# Patient Record
Sex: FEMALE | Race: WHITE | Employment: OTHER | URBAN - METROPOLITAN AREA
[De-identification: names, ages, dates, MRNs, and addresses within clinical notes are randomized per-mention and may not be internally consistent; named-entity substitution may affect disease eponyms.]

---

## 2017-01-06 ENCOUNTER — OFFICE VISIT (OUTPATIENT)
Dept: OBGYN CLINIC | Facility: CLINIC | Age: 72
End: 2017-01-06

## 2017-01-06 VITALS
WEIGHT: 168.19 LBS | DIASTOLIC BLOOD PRESSURE: 61 MMHG | HEART RATE: 82 BPM | SYSTOLIC BLOOD PRESSURE: 120 MMHG | BODY MASS INDEX: 34 KG/M2

## 2017-01-06 DIAGNOSIS — L90.0 LICHEN SCLEROSUS: Primary | ICD-10-CM

## 2017-01-06 PROCEDURE — 99213 OFFICE O/P EST LOW 20 MIN: CPT | Performed by: OBSTETRICS & GYNECOLOGY

## 2017-01-06 NOTE — PROGRESS NOTES
Jose Ramon King is a 70year old female  No LMP recorded. Patient is postmenopausal. Patient presents with: Follow - Up  Patient is here for follow up for lichen sclerosus. She is doing well. She uses the Triamcinolone ointment rarely.  She has not h mg total) by mouth daily. , Disp: 90 tablet, Rfl: 4  •  Estradiol (ESTRACE) 0.1 MG/GM Vaginal Cream, Place 1 g vaginally every other day. Place  vaginally. , Disp: 3 Tube, Rfl: 11  •  Citalopram Hydrobromide 20 MG Oral Tab, Take 1 tablet (20 mg total) by yeimi Lichen lesion much improved. No longer with white parchment paper skin. Area looks atrophic but no lesions seen.    Urethral Meatus:  normal in size, location, without lesions and prolapse  Perineum: normal      Assessment & Plan:  Amee Meredith was seen today for f

## 2017-01-11 NOTE — TELEPHONE ENCOUNTER
FYR:8-32  Last Filled:7-21, #32 with 5 refills  Labs:10-11, WNL  Future Appointments  Date Time Provider Guido Hoover   1/26/2017 10:00 AM Aureliano Rocher, MD [facility] [place]   7/7/2017 2:00 PM MD [facility] [place] [facility]       Pl

## 2017-01-20 ENCOUNTER — LAB ENCOUNTER (OUTPATIENT)
Dept: LAB | Age: 72
End: 2017-01-20
Attending: INTERNAL MEDICINE
Payer: MEDICARE

## 2017-01-20 DIAGNOSIS — Z51.81 ENCOUNTER FOR THERAPEUTIC DRUG MONITORING: ICD-10-CM

## 2017-01-20 DIAGNOSIS — M06.9 CHRONIC RHEUMATIC ARTHRITIS (HCC): Primary | ICD-10-CM

## 2017-01-20 LAB
ALT SERPL-CCNC: 19 U/L (ref 14–54)
AST SERPL-CCNC: 24 U/L (ref 15–41)
BASOPHILS # BLD: 0.1 K/UL (ref 0–0.2)
BASOPHILS NFR BLD: 1 %
CREAT SERPL-MCNC: 0.81 MG/DL (ref 0.5–1.5)
CRP SERPL-MCNC: <0.5 MG/DL (ref 0–0.9)
EOSINOPHIL # BLD: 0.3 K/UL (ref 0–0.7)
EOSINOPHIL NFR BLD: 6 %
ERYTHROCYTE [DISTWIDTH] IN BLOOD BY AUTOMATED COUNT: 14.1 % (ref 11–15)
ERYTHROCYTE [SEDIMENTATION RATE] IN BLOOD: 13 MM/HR (ref 0–30)
HCT VFR BLD AUTO: 37 % (ref 35–48)
HGB BLD-MCNC: 12.5 G/DL (ref 12–16)
LYMPHOCYTES # BLD: 1.7 K/UL (ref 1–4)
LYMPHOCYTES NFR BLD: 30 %
MCH RBC QN AUTO: 33.4 PG (ref 27–32)
MCHC RBC AUTO-ENTMCNC: 33.9 G/DL (ref 32–37)
MCV RBC AUTO: 98.5 FL (ref 80–100)
MONOCYTES # BLD: 0.6 K/UL (ref 0–1)
MONOCYTES NFR BLD: 10 %
NEUTROPHILS # BLD AUTO: 3 K/UL (ref 1.8–7.7)
NEUTROPHILS NFR BLD: 53 %
PLATELET # BLD AUTO: 176 K/UL (ref 140–400)
PMV BLD AUTO: 9.3 FL (ref 7.4–10.3)
RBC # BLD AUTO: 3.76 M/UL (ref 3.7–5.4)
WBC # BLD AUTO: 5.6 K/UL (ref 4–11)

## 2017-01-20 PROCEDURE — 84450 TRANSFERASE (AST) (SGOT): CPT

## 2017-01-20 PROCEDURE — 82565 ASSAY OF CREATININE: CPT

## 2017-01-20 PROCEDURE — 85025 COMPLETE CBC W/AUTO DIFF WBC: CPT

## 2017-01-20 PROCEDURE — 85652 RBC SED RATE AUTOMATED: CPT

## 2017-01-20 PROCEDURE — 86140 C-REACTIVE PROTEIN: CPT

## 2017-01-20 PROCEDURE — 84460 ALANINE AMINO (ALT) (SGPT): CPT

## 2017-01-20 PROCEDURE — 36415 COLL VENOUS BLD VENIPUNCTURE: CPT

## 2017-01-26 ENCOUNTER — OFFICE VISIT (OUTPATIENT)
Dept: RHEUMATOLOGY | Facility: CLINIC | Age: 72
End: 2017-01-26

## 2017-01-26 VITALS
WEIGHT: 170.63 LBS | DIASTOLIC BLOOD PRESSURE: 83 MMHG | HEART RATE: 73 BPM | HEIGHT: 59 IN | BODY MASS INDEX: 34.4 KG/M2 | SYSTOLIC BLOOD PRESSURE: 144 MMHG

## 2017-01-26 DIAGNOSIS — M06.9 RHEUMATOID ARTHRITIS INVOLVING MULTIPLE SITES, UNSPECIFIED RHEUMATOID FACTOR PRESENCE: Primary | ICD-10-CM

## 2017-01-26 DIAGNOSIS — M81.0 OSTEOPOROSIS: ICD-10-CM

## 2017-01-26 DIAGNOSIS — Z51.81 THERAPEUTIC DRUG MONITORING: ICD-10-CM

## 2017-01-26 PROCEDURE — 99214 OFFICE O/P EST MOD 30 MIN: CPT | Performed by: INTERNAL MEDICINE

## 2017-01-26 PROCEDURE — G0463 HOSPITAL OUTPT CLINIC VISIT: HCPCS | Performed by: INTERNAL MEDICINE

## 2017-01-26 RX ORDER — HYDROCODONE BITARTRATE AND ACETAMINOPHEN 10; 325 MG/1; MG/1
1 TABLET ORAL
Refills: 0 | COMMUNITY
Start: 2016-12-22 | End: 2017-09-28

## 2017-01-26 RX ORDER — HYDROCODONE BITARTRATE AND ACETAMINOPHEN 10; 325 MG/1; MG/1
1 TABLET ORAL EVERY 12 HOURS PRN
Qty: 60 TABLET | Refills: 0 | Status: SHIPPED | OUTPATIENT
Start: 2017-02-26 | End: 2017-03-28

## 2017-01-26 RX ORDER — HYDROCODONE BITARTRATE AND ACETAMINOPHEN 10; 325 MG/1; MG/1
1 TABLET ORAL EVERY 12 HOURS PRN
Qty: 60 TABLET | Refills: 0 | Status: SHIPPED | OUTPATIENT
Start: 2017-03-26 | End: 2017-04-25

## 2017-01-26 RX ORDER — HYDROCODONE BITARTRATE AND ACETAMINOPHEN 10; 325 MG/1; MG/1
1 TABLET ORAL EVERY 12 HOURS PRN
Qty: 60 TABLET | Refills: 0 | Status: SHIPPED | OUTPATIENT
Start: 2017-01-26 | End: 2017-02-25

## 2017-01-26 RX ORDER — GABAPENTIN 100 MG/1
CAPSULE ORAL
Qty: 81 CAPSULE | Refills: 0 | Status: SHIPPED | OUTPATIENT
Start: 2017-01-26 | End: 2017-04-27

## 2017-01-26 NOTE — PROGRESS NOTES
Raphael Chaparro is a 70year old female. HPI:   Patient presents with:  Rheumatoid Arthritis  Knee Pain  Hip Pain  Joint Pain      I had the pleasure of seeing your patient Mrs. Alyson Meraz  On 1/27/2017. I had last seen her on 10/20/206.  I had last see cramps. This year was the same thing. She thinks it might not be the fosamax. She is hydrating more. 1/272017  Her pain is a lot worse. She doesn't know if it's the weather. But her left hip hurts more and radiates down her left leg.  She has more knee Cream Place 1 g vaginally every other day. Place  vaginally. Disp: 3 Tube Rfl: 11   Citalopram Hydrobromide 20 MG Oral Tab Take 1 tablet (20 mg total) by mouth daily.  Disp: 90 tablet Rfl: 4   Zolpidem Tartrate 10 MG Oral Tab TAKE ONE TABLET BY MOUTH ONCE D 80.0-100.0 fL 98.5   Mean Corpuscular Hemoglobin      27.0-32.0 pg 33.4 (H)   Mean Corpuscular HGB Conc      32.0-37.0 g/dl 33.9   RDW      11.0-15.0 % 14.1   Platelet Count      120-569 K/   MEAN PLATELET VOLUME      7.4-10.3 fL 9.3   Neutrophils % -   Refill norco 3 months. - she will think about ssz - but she feels good - d/w her that 3 drug could prevent progression in her hands = she is aware but wants to hold off  2. fibromyalgia.   - - add gabapnetin 100mg x 3 days, then 200mg x 3 days, then

## 2017-01-26 NOTE — PATIENT INSTRUCTIONS
1. Cont  Methotrexate 8 tablets a week   2. Cont. sulfasalazine  1000mg in am and 500mg in pm    3. Check labs every 3 months  4. norco 10/325mg #60 with 2 refills - 3 month supply  5. Return to clinic in 3 months  6. Cont.  Calcium 1500mg a day and vit d 8

## 2017-02-24 ENCOUNTER — PATIENT MESSAGE (OUTPATIENT)
Dept: RHEUMATOLOGY | Facility: CLINIC | Age: 72
End: 2017-02-24

## 2017-02-24 NOTE — TELEPHONE ENCOUNTER
From: Nilson Keith  To: Radha Sainz MD  Sent: 2/24/2017 11:48 AM CST  Subject: Prescription Question    Dear Dr. Gema Valverde,  As per our last office visit I have been taking Gabapentin daily according to your instructions.  I have not had relief from the

## 2017-02-24 NOTE — TELEPHONE ENCOUNTER
Pt stated she is taking 100 mg gabapentin in the am and 200 mg in the afternoon. Reports it makes her sleepy and does not help with her pain. Pt has not tried to take medication before bedtime.  Pt stated she needs something to help her with pain during the

## 2017-03-02 ENCOUNTER — PATIENT MESSAGE (OUTPATIENT)
Dept: RHEUMATOLOGY | Facility: CLINIC | Age: 72
End: 2017-03-02

## 2017-03-02 NOTE — TELEPHONE ENCOUNTER
From: Sourav Ordoñez  To: Oscar Chau MD  Sent: 3/2/2017 9:59 AM CST  Subject: Prescription Question    I have yet to receive a response regarding my letter from a week ago. I very upset about this. Please respond !

## 2017-03-08 ENCOUNTER — PATIENT MESSAGE (OUTPATIENT)
Dept: RHEUMATOLOGY | Facility: CLINIC | Age: 72
End: 2017-03-08

## 2017-03-08 NOTE — TELEPHONE ENCOUNTER
LOV: 1/26/17  Future Appointments  Date Time Provider Guido Bardalesi   4/27/2017 2:30 PM Arnie Gordon MD [facility] [place] [facility] [place]   7/7/2017 2:00 PM [facility]     Labs: 1/20/17 WNL

## 2017-03-09 ENCOUNTER — TELEPHONE (OUTPATIENT)
Dept: RHEUMATOLOGY | Facility: CLINIC | Age: 72
End: 2017-03-09

## 2017-03-09 ENCOUNTER — TELEPHONE (OUTPATIENT)
Dept: INTERNAL MEDICINE CLINIC | Facility: CLINIC | Age: 72
End: 2017-03-09

## 2017-03-09 RX ORDER — PREGABALIN 50 MG/1
50 CAPSULE ORAL 2 TIMES DAILY
Qty: 60 CAPSULE | Refills: 0 | OUTPATIENT
Start: 2017-03-09 | End: 2017-04-15

## 2017-03-09 RX ORDER — ZOLPIDEM TARTRATE 10 MG/1
TABLET ORAL
Qty: 90 TABLET | Refills: 0 | Status: SHIPPED | OUTPATIENT
Start: 2017-03-09 | End: 2017-06-07

## 2017-03-09 NOTE — TELEPHONE ENCOUNTER
Left message with the patient - regarding her messages - let her know that i did get her messges - was thinking about what changes to make -   - ok to stop gabpentin if not working for her - can increase sulfasalazine to 1000mg bid and to 1500mg bid   - wi

## 2017-03-09 NOTE — TELEPHONE ENCOUNTER
Patient requesting refill for Zolpidem 10 mg. LOV 09/15/16. Last refilled on 09/14/16 for 90 tablets / 1 refill. Please advise.

## 2017-03-09 NOTE — TELEPHONE ENCOUNTER
From: Nilson Keith  To: Radha Sainz MD  Sent: 3/8/2017 6:12 PM CST  Subject: Other    I have NOT had a response to the 2 messages I sent on 2/24/17 and 3/02/17

## 2017-03-10 NOTE — TELEPHONE ENCOUNTER
Called pt.  She is ok to try lyrica 50mg at night x 1 -2 weeks, then icnreae to twice a day   D/w her about drowsiness ,   She thinks she has more fibro than a arthritis flare

## 2017-03-29 ENCOUNTER — PATIENT MESSAGE (OUTPATIENT)
Dept: RHEUMATOLOGY | Facility: CLINIC | Age: 72
End: 2017-03-29

## 2017-03-29 PROBLEM — G47.01 INSOMNIA SECONDARY TO CHRONIC PAIN: Status: ACTIVE | Noted: 2017-03-29

## 2017-03-29 PROBLEM — G89.29 INSOMNIA SECONDARY TO CHRONIC PAIN: Status: ACTIVE | Noted: 2017-03-29

## 2017-03-29 PROBLEM — M81.0 OSTEOPOROSIS: Status: ACTIVE | Noted: 2017-03-29

## 2017-03-29 PROBLEM — M79.7 FIBROMYALGIA: Status: ACTIVE | Noted: 2017-03-29

## 2017-03-29 PROBLEM — F17.201 TOBACCO DEPENDENCE IN REMISSION: Status: ACTIVE | Noted: 2017-03-29

## 2017-03-29 PROBLEM — I70.0 ATHEROSCLEROSIS OF AORTA (HCC): Status: ACTIVE | Noted: 2017-03-29

## 2017-03-30 NOTE — TELEPHONE ENCOUNTER
From: Evelia Nguyen  To: Yaquelin Willis MD  Sent: 3/29/2017 7:26 PM CDT  Subject: Prescription Question    Hi Dr. Lucia, Just letting you know that Lyrica is working pretty good for me.  Thank You :)

## 2017-04-03 ENCOUNTER — OFFICE VISIT (OUTPATIENT)
Dept: FAMILY MEDICINE CLINIC | Facility: CLINIC | Age: 72
End: 2017-04-03

## 2017-04-03 VITALS
BODY MASS INDEX: 34.62 KG/M2 | DIASTOLIC BLOOD PRESSURE: 65 MMHG | HEART RATE: 65 BPM | SYSTOLIC BLOOD PRESSURE: 122 MMHG | HEIGHT: 59.5 IN | WEIGHT: 174 LBS

## 2017-04-03 DIAGNOSIS — R01.1 CARDIAC MURMUR: ICD-10-CM

## 2017-04-03 DIAGNOSIS — Z00.00 ENCOUNTER FOR ANNUAL HEALTH EXAMINATION: ICD-10-CM

## 2017-04-03 DIAGNOSIS — Z12.31 VISIT FOR SCREENING MAMMOGRAM: ICD-10-CM

## 2017-04-03 DIAGNOSIS — M81.0 OSTEOPOROSIS, UNSPECIFIED OSTEOPOROSIS TYPE, UNSPECIFIED PATHOLOGICAL FRACTURE PRESENCE: ICD-10-CM

## 2017-04-03 DIAGNOSIS — I70.0 ATHEROSCLEROSIS OF AORTA (HCC): Primary | ICD-10-CM

## 2017-04-03 DIAGNOSIS — M06.9 RHEUMATOID ARTHRITIS, INVOLVING UNSPECIFIED SITE, UNSPECIFIED RHEUMATOID FACTOR PRESENCE: ICD-10-CM

## 2017-04-03 DIAGNOSIS — F17.201 TOBACCO DEPENDENCE IN REMISSION: ICD-10-CM

## 2017-04-03 DIAGNOSIS — I10 ESSENTIAL HYPERTENSION: ICD-10-CM

## 2017-04-03 DIAGNOSIS — E78.2 MIXED HYPERLIPIDEMIA: ICD-10-CM

## 2017-04-03 DIAGNOSIS — M79.7 FIBROMYALGIA: ICD-10-CM

## 2017-04-03 DIAGNOSIS — E03.9 HYPOTHYROIDISM, UNSPECIFIED TYPE: ICD-10-CM

## 2017-04-03 PROBLEM — G47.01 INSOMNIA SECONDARY TO CHRONIC PAIN: Status: RESOLVED | Noted: 2017-03-29 | Resolved: 2017-04-03

## 2017-04-03 PROBLEM — G89.29 INSOMNIA SECONDARY TO CHRONIC PAIN: Status: RESOLVED | Noted: 2017-03-29 | Resolved: 2017-04-03

## 2017-04-03 PROCEDURE — 96160 PT-FOCUSED HLTH RISK ASSMT: CPT | Performed by: FAMILY MEDICINE

## 2017-04-03 PROCEDURE — G0438 PPPS, INITIAL VISIT: HCPCS | Performed by: FAMILY MEDICINE

## 2017-04-03 RX ORDER — SULFASALAZINE 500 MG/1
TABLET ORAL
Qty: 90 TABLET | Refills: 3 | Status: SHIPPED | OUTPATIENT
Start: 2017-04-03 | End: 2017-04-27

## 2017-04-03 NOTE — TELEPHONE ENCOUNTER
LOV:1-26  Last Filled:11-28, #90 with 3 refills  Labs:1-20, WNL  Future Appointments  Date Time Provider Guido Hoover   4/3/2017 1:45 PM Mushtaq Polanco MD [facility]  [facility]   4/27/2017 2:30 PM Jett Dwyer MD [address]  [place]   7/7/2017 2

## 2017-04-03 NOTE — PROGRESS NOTES
HPI:   Bryant Hendrix is a 70year old female who presents for a Medicare Subsequent Annual Wellness visit (Pt already had Initial Annual Wellness).     Pt here for regular physical. She is frustrated that she is not losing weight though she exercises brooke surgery (Bilateral); knee replacement surgery; carpal tunnel release (Bilateral); tonsillectomy; d & c (1967); tubal ligation (1981); and other surgical history.     Her family history includes Arthritis in her mother; Breast Cancer in her maternal grandmot [place]:  No I have   trouble hearing conversations in a noisy background such as a crowded room   or restaurant:  Yes   I get confused about where sounds come from:  Sometimes I misunderstand   some words in a sentence and need to ask people to repeat th Lymph nodes: Cervical, supraclavicular, and axillary nodes normal   Neurologic: Normal       SUGGESTED VACCINATIONS - Influenza, Pneumococcal, Zoster, Tetanus     There is no immunization history on file for this patient.     ASSESSMENT AND OTHER RELEVANT Follow-up on file.      Ebony Gutierrez MD, 4/3/2017     General Health     In the past six months, have you lost more than 10 pounds without trying?: 2 - No    Has your appetite been poor?: No    How does the patient maintain a good energy level?: Appropr Not at all    Feeling down, depressed, or hopeless (over the last two weeks)?: Not at all    PHQ-2 SCORE: 0        Advance Directives     Do you have a healthcare power of ?:  (not sure)    Do you have a living will?: Yes     Please go to [facility] 21-65 or Pap+HPV every 5 yrs age 33-67, age 72 and older at high risk There are no preventive care reminders to display for this patient.  Update Health Maintenance if applicable    Chlamydia  Annually if high risk No results found for: CHLAMYDIA No flowshe Annually BUN (mg/dL)   Date Value   10/11/2016 19   07/18/2016 15    No flowsheet data found. Drug Serum Conc  Annually No results found for: DIGOXIN, DIG, VALP No flowsheet data found.     Diabetes      HgbA1C  Annually GLYCOHEMOGLOBIN (HGA1C) (L) (%)

## 2017-04-03 NOTE — PATIENT INSTRUCTIONS
Rober Jean SCREENING SCHEDULE   Tests on this list are recommended by your physician but may not be covered, or covered at this frequency, by your insurer. Please check with your insurance carrier before scheduling to verify coverage.    PREVENTATIV Screen   Covered every 10 years- more often if abnormal Colonoscopy,10 Years due on 06/24/1995 Update Health Maintenance if applicable    Flex Sigmoidoscopy Screen  Covered every 5 years No results found for this or any previous visit.  No flowsheet data fo visit. Please get once after your 65th birthday    Pneumococcal 23 (Pneumovax)  Covered Once after 65 No orders found for this or any previous visit.  Please get once after your 65th birthday    Hepatitis B for Moderate/High Risk       No orders found for t

## 2017-04-15 RX ORDER — PREGABALIN 50 MG/1
50 CAPSULE ORAL 2 TIMES DAILY
Qty: 60 CAPSULE | Refills: 3 | OUTPATIENT
Start: 2017-04-15 | End: 2017-07-19

## 2017-04-15 NOTE — TELEPHONE ENCOUNTER
LOV:1-26  Last Filled:3-9, #60 with 0 refill  Labs:   Future Appointments  Date Time Provider Guido Hoover   4/27/2017 2:30 PM Sidney Montoya MD [facility] [place] [facility] [place]   7/7/2017 2:00 PM Cassie Mann MD [facility]       Please Advise

## 2017-04-15 NOTE — TELEPHONE ENCOUNTER
Fax received from [facility]. Pt requesting refill on the following medication. Please fax to [phone].       Current Outpatient Prescriptions:              pregabalin (LYRICA) 50 MG Oral Cap Take 1 capsule (50 mg total) by mouth 2 (two)

## 2017-04-20 ENCOUNTER — LAB ENCOUNTER (OUTPATIENT)
Dept: LAB | Age: 72
End: 2017-04-20
Attending: FAMILY MEDICINE
Payer: MEDICARE

## 2017-04-20 DIAGNOSIS — Z00.00 ENCOUNTER FOR ANNUAL HEALTH EXAMINATION: ICD-10-CM

## 2017-04-20 DIAGNOSIS — M06.9 RHEUMATOID ARTHRITIS INVOLVING MULTIPLE SITES, UNSPECIFIED RHEUMATOID FACTOR PRESENCE: ICD-10-CM

## 2017-04-20 DIAGNOSIS — E03.9 HYPOTHYROIDISM, UNSPECIFIED TYPE: Primary | ICD-10-CM

## 2017-04-20 DIAGNOSIS — E78.2 MIXED HYPERLIPIDEMIA: ICD-10-CM

## 2017-04-20 DIAGNOSIS — Z51.81 THERAPEUTIC DRUG MONITORING: ICD-10-CM

## 2017-04-20 PROCEDURE — 84443 ASSAY THYROID STIM HORMONE: CPT | Performed by: FAMILY MEDICINE

## 2017-04-20 PROCEDURE — 84439 ASSAY OF FREE THYROXINE: CPT | Performed by: FAMILY MEDICINE

## 2017-04-20 PROCEDURE — 85652 RBC SED RATE AUTOMATED: CPT

## 2017-04-20 PROCEDURE — 84460 ALANINE AMINO (ALT) (SGPT): CPT

## 2017-04-20 PROCEDURE — 80061 LIPID PANEL: CPT

## 2017-04-20 PROCEDURE — 85025 COMPLETE CBC W/AUTO DIFF WBC: CPT

## 2017-04-20 PROCEDURE — 80048 BASIC METABOLIC PNL TOTAL CA: CPT

## 2017-04-20 PROCEDURE — 36415 COLL VENOUS BLD VENIPUNCTURE: CPT

## 2017-04-20 PROCEDURE — 86140 C-REACTIVE PROTEIN: CPT

## 2017-04-20 PROCEDURE — 84450 TRANSFERASE (AST) (SGOT): CPT

## 2017-04-21 ENCOUNTER — PATIENT MESSAGE (OUTPATIENT)
Dept: FAMILY MEDICINE CLINIC | Facility: CLINIC | Age: 72
End: 2017-04-21

## 2017-04-22 ENCOUNTER — APPOINTMENT (OUTPATIENT)
Dept: LAB | Age: 72
End: 2017-04-22
Attending: FAMILY MEDICINE
Payer: MEDICARE

## 2017-04-22 PROCEDURE — 82274 ASSAY TEST FOR BLOOD FECAL: CPT

## 2017-04-22 RX ORDER — LEVOTHYROXINE SODIUM 112 UG/1
TABLET ORAL
Qty: 36 TABLET | Refills: 0 | Status: SHIPPED | OUTPATIENT
Start: 2017-04-22 | End: 2017-06-15

## 2017-04-22 NOTE — TELEPHONE ENCOUNTER
Spoke with pt and levothyroxine 112 mcg rx sent to pharmacy as requested, as per Dr Trish Vang result note instructions to pt.     Notes Recorded by Stacy Eagle MD on 4/20/2017 at 4:16 PM  Thyroid levels are still within normal range but I know you are not

## 2017-04-27 ENCOUNTER — OFFICE VISIT (OUTPATIENT)
Dept: RHEUMATOLOGY | Facility: CLINIC | Age: 72
End: 2017-04-27

## 2017-04-27 VITALS
HEIGHT: 59.5 IN | TEMPERATURE: 98 F | HEART RATE: 70 BPM | DIASTOLIC BLOOD PRESSURE: 70 MMHG | SYSTOLIC BLOOD PRESSURE: 138 MMHG | WEIGHT: 170 LBS | BODY MASS INDEX: 33.82 KG/M2

## 2017-04-27 DIAGNOSIS — M79.7 FIBROMYALGIA: ICD-10-CM

## 2017-04-27 DIAGNOSIS — Z51.81 THERAPEUTIC DRUG MONITORING: ICD-10-CM

## 2017-04-27 DIAGNOSIS — M06.9 RHEUMATOID ARTHRITIS INVOLVING MULTIPLE SITES, UNSPECIFIED RHEUMATOID FACTOR PRESENCE: Primary | ICD-10-CM

## 2017-04-27 PROCEDURE — 99214 OFFICE O/P EST MOD 30 MIN: CPT | Performed by: INTERNAL MEDICINE

## 2017-04-27 PROCEDURE — G0463 HOSPITAL OUTPT CLINIC VISIT: HCPCS | Performed by: INTERNAL MEDICINE

## 2017-04-27 RX ORDER — HYDROCODONE BITARTRATE AND ACETAMINOPHEN 10; 325 MG/1; MG/1
1 TABLET ORAL EVERY 12 HOURS PRN
Qty: 60 TABLET | Refills: 0 | Status: SHIPPED | OUTPATIENT
Start: 2017-06-27 | End: 2017-07-27

## 2017-04-27 RX ORDER — HYDROCODONE BITARTRATE AND ACETAMINOPHEN 10; 325 MG/1; MG/1
1 TABLET ORAL EVERY 12 HOURS PRN
Qty: 60 TABLET | Refills: 0 | Status: SHIPPED | OUTPATIENT
Start: 2017-05-27 | End: 2017-06-26

## 2017-04-27 RX ORDER — FOLIC ACID 1 MG/1
TABLET ORAL
COMMUNITY
Start: 2011-04-12 | End: 2017-09-28

## 2017-04-27 RX ORDER — ALENDRONATE SODIUM 70 MG/1
TABLET ORAL WEEKLY
Refills: 2 | COMMUNITY
Start: 2017-04-12 | End: 2017-09-28

## 2017-04-27 RX ORDER — HYDROCODONE BITARTRATE AND ACETAMINOPHEN 10; 325 MG/1; MG/1
1 TABLET ORAL EVERY 12 HOURS PRN
Qty: 60 TABLET | Refills: 0 | Status: SHIPPED | OUTPATIENT
Start: 2017-04-27 | End: 2017-05-27

## 2017-04-27 RX ORDER — SULFASALAZINE 500 MG/1
1500 TABLET ORAL 2 TIMES DAILY
Qty: 180 TABLET | Refills: 3 | Status: SHIPPED | OUTPATIENT
Start: 2017-04-27 | End: 2017-07-27

## 2017-04-27 NOTE — PATIENT INSTRUCTIONS
1. Cont  Methotrexate 8 tablets a week   2. Increase to  sulfasalazine  1500mg in am and 1500mg in pm    3. Check labs every 3 months  4. norco 10/325mg #60 with 2 refills -   5. Return to clinic in 3 months  6. Cont.  Calcium 1500mg a day and vit d 800unit trouble  What may interact with this medicine?   Do not take this medicine with any of the following medications:  · abatacept  · adalimumab  · anakinra  · azathioprine  · certolizumab  · cyclosporine  · etanercept  · golimumab  · infliximab  · live vaccine Last Reviewed:   NOTE:This sheet is a summary. It may not cover all possible information. If you have questions about this medicine, talk to your doctor, pharmacist, or health care provider.  Copyright© 2016 [employer]

## 2017-04-27 NOTE — PROGRESS NOTES
Evelia Nguyen is a 70year old female. HPI:   Patient presents with:  Rheumatoid Arthritis  Joint Pain      I had the pleasure of seeing your patient Mrs. Pam Al  On 4/27/2017. She was here last on  1/27/2017. I had last seen her on 10/20/206.  I causing the cramps. This year was the same thing. She thinks it might not be the fosamax. She is hydrating more. 1/272017  Her pain is a lot worse. She doesn't know if it's the weather. But her left hip hurts more and radiates down her left leg.  She ha Alendronate Sodium 70 MG Oral Tab once a week. Disp:  Rfl: 2   Levothyroxine Sodium (LEVOTHROID) 112 MCG Oral Tab Take one tab (112 mcg) by mouth on Mon, Wed, Friday.  Continue to take levothyroxine 100 mcg tab on all other days Disp: 36 tablet Rfl: 0   p Other (See Comments)    Comment:Abdominal pain      ROS:   All other ROS are negative.      PHYSICAL EXAM:   HEENT: Clear oropharynx, no oral ulcers, EOM intact, clear sclear, PERRLA, pleasant, no acute distress, no CAD, no neck tendnerness, go osteoporosis. 3. Probable old fracture left ulnar styloid. No erosions. DEXA 4/28/2016  LEFT FEMORAL NECK       BMD:  0.474 gm/sq. cm.    T SCORE: -3.4      Change since previous:  14% decrease     PA LUMBAR SPINE (L1 - L4)       BMD:  0.899 gm/sq.  c will try exercise and gabapentin. Summary:  1. Cont  Methotrexate 8 tablets a week   2. Increase to  sulfasalazine  1500mg in am and 1500mg in pm    3. Check labs every 3 months  4. norco 10/325mg #60 with 2 refills -   5.  Return to clinic in 3 months

## 2017-04-28 ENCOUNTER — PATIENT MESSAGE (OUTPATIENT)
Dept: FAMILY MEDICINE CLINIC | Facility: CLINIC | Age: 72
End: 2017-04-28

## 2017-04-28 DIAGNOSIS — M06.9 RHEUMATOID ARTHRITIS, INVOLVING UNSPECIFIED SITE, UNSPECIFIED RHEUMATOID FACTOR PRESENCE: Primary | ICD-10-CM

## 2017-05-01 ENCOUNTER — HOSPITAL ENCOUNTER (OUTPATIENT)
Dept: MAMMOGRAPHY | Age: 72
Discharge: HOME | End: 2017-05-01
Attending: FAMILY MEDICINE
Payer: MEDICARE

## 2017-05-01 DIAGNOSIS — Z12.31 VISIT FOR SCREENING MAMMOGRAM: ICD-10-CM

## 2017-05-01 PROCEDURE — 77067 SCR MAMMO BI INCL CAD: CPT

## 2017-05-02 NOTE — TELEPHONE ENCOUNTER
From: Claudia Adair  To:  Anson Siemens, MD  Sent: 4/28/2017 2:31 PM CDT  Subject: Other    On April 27 I had an appointment with James Dennison at the [address] [place] [address] office and the  stated that I would need a referral put in the system before

## 2017-05-02 NOTE — TELEPHONE ENCOUNTER
DR SANCHEZ: please see msg below; patient was seen on 4/27/17 with Dr. Dale Bravo. Not sure if a referral can be back dated. He also has a future appt 7/27/17 with RHEUM.

## 2017-05-04 ENCOUNTER — TELEPHONE (OUTPATIENT)
Dept: OBGYN CLINIC | Facility: CLINIC | Age: 72
End: 2017-05-04

## 2017-05-16 ENCOUNTER — TELEPHONE (OUTPATIENT)
Dept: RHEUMATOLOGY | Facility: CLINIC | Age: 72
End: 2017-05-16

## 2017-05-16 NOTE — TELEPHONE ENCOUNTER
Pt states has pain both hips, difficult to walk  Requesting appt 5/18/[facility] for cortisone injection- no slots

## 2017-05-16 NOTE — TELEPHONE ENCOUNTER
Please see below and advise. Okay to add at 1 pm on Thursday? Left message for pt, provider out of office today. Call back with any questions.

## 2017-05-17 NOTE — TELEPHONE ENCOUNTER
Left message to call office back. Can pt be seen tomorrow 5/18 in [place] at 1 pm. If pt agreeable, please add pt to Dr. Christopher Bertrand schedule.

## 2017-05-18 ENCOUNTER — OFFICE VISIT (OUTPATIENT)
Dept: RHEUMATOLOGY | Facility: CLINIC | Age: 72
End: 2017-05-18

## 2017-05-18 ENCOUNTER — HOSPITAL ENCOUNTER (OUTPATIENT)
Dept: GENERAL RADIOLOGY | Age: 72
Discharge: HOME | End: 2017-05-18
Attending: INTERNAL MEDICINE
Payer: MEDICARE

## 2017-05-18 VITALS
BODY MASS INDEX: 33.82 KG/M2 | SYSTOLIC BLOOD PRESSURE: 136 MMHG | DIASTOLIC BLOOD PRESSURE: 75 MMHG | HEART RATE: 79 BPM | WEIGHT: 170 LBS | HEIGHT: 59.5 IN | TEMPERATURE: 98 F

## 2017-05-18 DIAGNOSIS — M25.552 BILATERAL HIP PAIN: ICD-10-CM

## 2017-05-18 DIAGNOSIS — M06.9 RHEUMATOID ARTHRITIS INVOLVING MULTIPLE SITES, UNSPECIFIED RHEUMATOID FACTOR PRESENCE: Primary | ICD-10-CM

## 2017-05-18 DIAGNOSIS — M06.9 RHEUMATOID ARTHRITIS INVOLVING MULTIPLE SITES, UNSPECIFIED RHEUMATOID FACTOR PRESENCE: ICD-10-CM

## 2017-05-18 DIAGNOSIS — M25.551 BILATERAL HIP PAIN: ICD-10-CM

## 2017-05-18 PROCEDURE — 99213 OFFICE O/P EST LOW 20 MIN: CPT | Performed by: INTERNAL MEDICINE

## 2017-05-18 PROCEDURE — 73522 X-RAY EXAM HIPS BI 3-4 VIEWS: CPT | Performed by: INTERNAL MEDICINE

## 2017-05-18 PROCEDURE — 20610 DRAIN/INJ JOINT/BURSA W/O US: CPT | Performed by: INTERNAL MEDICINE

## 2017-05-18 RX ORDER — TRIAMCINOLONE ACETONIDE 40 MG/ML
40 INJECTION, SUSPENSION INTRA-ARTICULAR; INTRAMUSCULAR
Status: COMPLETED | OUTPATIENT
Start: 2017-05-18 | End: 2017-05-18

## 2017-05-18 RX ADMIN — TRIAMCINOLONE ACETONIDE 40 MG: 40 INJECTION, SUSPENSION INTRA-ARTICULAR; INTRAMUSCULAR at 13:45:00

## 2017-05-18 RX ADMIN — TRIAMCINOLONE ACETONIDE 40 MG: 40 INJECTION, SUSPENSION INTRA-ARTICULAR; INTRAMUSCULAR at 13:35:00

## 2017-05-18 NOTE — PROCEDURES
With paitent's consent, I injected pt's both hips/trochnatic bursitis each with 1ml lidocaine 1 % and 1 ml kenalog 40. It was done under sterile technique using iodine and alcohol swabs and ethyl chloride was used as an anaesthetic spray.  Pt.  tolerated it

## 2017-05-18 NOTE — PROGRESS NOTES
Aliya Soni is a 70year old female. HPI:   Patient presents with:  Rheumatoid Arthritis  Hip Pain: bilateral      I had the pleasure of seeing your patient Mrs. Carlee Durbin  On 5/18/2017. I had last seen her on 4/27/2017.  She was here last on  1/27 thinks that if she is dehyrdated she was causing the cramps. This year was the same thing. She thinks it might not be the fosamax. She is hydrating more. 1/272017  Her pain is a lot worse. She doesn't know if it's the weather.  But her left hip hurts mo per CLAUDIO   • Depression ?    • Thyroid disease 3207   • Lichen sclerosus       Social Hx Reviewed   Family Hx Reviewed     Medications (Active prior to today's visit):    Current Outpatient Prescriptions:  folic acid 1 MG Oral Tab  Disp:  Rfl:    Alendronate 600 MG Oral Tab Take 1,200 mg by mouth daily. Take 2 tabs every day Disp:  Rfl:    Levothyroxine Sodium 100 MCG Oral Tab Take 1 tablet (100 mcg total) by mouth daily.  Disp: 90 tablet Rfl: 3   simvastatin 5 MG Oral Tab Take 1 tablet (5 mg total) by mouth ni %       53   Lymphocytes %       30   Monocytes %       10   Eosinophils %       6   Basophils %       1   Neutrophils Absolute      1.8-7.7 K/UL 3.0   Lymphocytes Absolute      1.0-4.0 K/UL 1.7   Monocytes Absolute      0.0-1.0 K/UL 0.6   Eosinophils Abso drug could prevent progression in her hands = she is aware but wants to hold off  2. fibromyalgia. - - add gabapnetin 100mg x 3 days, then 200mg x 3 days, then 300mg daily -  She can cont.  ambien and exercise for her , doing well - exercising every day an

## 2017-06-12 RX ORDER — ZOLPIDEM TARTRATE 10 MG/1
TABLET ORAL
Qty: 90 TABLET | Refills: 1 | Status: SHIPPED | OUTPATIENT
Start: 2017-06-12 | End: 2018-01-09

## 2017-06-12 NOTE — TELEPHONE ENCOUNTER
Patient is calling to follow up on medication refill request. Patient is completely out. Please advise.

## 2017-06-17 RX ORDER — LEVOTHYROXINE SODIUM 112 UG/1
TABLET ORAL
Qty: 108 TABLET | Refills: 0 | Status: SHIPPED | OUTPATIENT
Start: 2017-06-17 | End: 2017-11-03 | Stop reason: DRUGHIGH

## 2017-06-17 NOTE — TELEPHONE ENCOUNTER
Refilled per protocol.    Hypothyroid Medications  Protocol Criteria:  Appointment scheduled in the past 12 months or the next 3 months  TSH resulted in the past 12 months that is normal  Recent Visits       Provider Department Primary Dx    2 months ago Nesha Laurent

## 2017-07-14 ENCOUNTER — TELEPHONE (OUTPATIENT)
Dept: OBGYN CLINIC | Facility: CLINIC | Age: 72
End: 2017-07-14

## 2017-07-14 NOTE — TELEPHONE ENCOUNTER
Jaden Matt asked to call the pt to see if she can changed her appt from 11:40 on 7/19 to another time. Pt scheduled the same date, same placed with JERAMIE at 9:40 AM on 7/19/17.

## 2017-07-19 ENCOUNTER — OFFICE VISIT (OUTPATIENT)
Dept: OBGYN CLINIC | Facility: CLINIC | Age: 72
End: 2017-07-19

## 2017-07-19 ENCOUNTER — TELEPHONE (OUTPATIENT)
Dept: FAMILY MEDICINE CLINIC | Facility: CLINIC | Age: 72
End: 2017-07-19

## 2017-07-19 VITALS
BODY MASS INDEX: 35 KG/M2 | DIASTOLIC BLOOD PRESSURE: 76 MMHG | WEIGHT: 178.19 LBS | HEART RATE: 77 BPM | SYSTOLIC BLOOD PRESSURE: 134 MMHG

## 2017-07-19 DIAGNOSIS — L90.0 LICHEN SCLEROSUS: Primary | ICD-10-CM

## 2017-07-19 DIAGNOSIS — M06.9 RHEUMATOID ARTHRITIS(714.0): Primary | ICD-10-CM

## 2017-07-19 PROCEDURE — 99213 OFFICE O/P EST LOW 20 MIN: CPT | Performed by: OBSTETRICS & GYNECOLOGY

## 2017-07-19 NOTE — PROGRESS NOTES
Devin Pillai is a 67year old female  No LMP recorded. Patient is postmenopausal. Patient presents with: Follow - Up: Lichen sclerosus  Patient presents today for follow up of Lichen Sclerosis.  She states it is doing well with no itching or irrit LEVOTHYROXINE SODIUM 112 MCG Oral Tab, TAKE ONE TABLET BY MOUTH ON MONDAY,WEDNESDAY AND FRIDAY.  CONTINUE TO TAKE LEVOTHYROXINE 100 MCG TABLET ON ALL OTHER DAYS, Disp: 108 tablet, Rfl: 0  •  ZOLPIDEM TARTRATE 10 MG Oral Tab, TAKE ONE TABLET BY MOUTH ONCE DA dysuria, incontinence, abnormal vaginal discharge, vaginal itching  Psychiatric: denies depression or anxiety.      07/19/17  0935   BP: 134/76   Pulse: 77       PHYSICAL EXAM:   Constitutional: well developed, well nourished  Head/Face: normocephalic  Psyc

## 2017-07-19 NOTE — TELEPHONE ENCOUNTER
Patient is requesting a referral for Dr Madi Horner. Patient does not need a copy. Appt scheduled for 7/27/17.

## 2017-07-21 ENCOUNTER — LAB ENCOUNTER (OUTPATIENT)
Dept: LAB | Age: 72
End: 2017-07-21
Attending: INTERNAL MEDICINE
Payer: MEDICARE

## 2017-07-21 DIAGNOSIS — M06.9 RHEUMATOID ARTHRITIS INVOLVING MULTIPLE SITES, UNSPECIFIED RHEUMATOID FACTOR PRESENCE: ICD-10-CM

## 2017-07-21 DIAGNOSIS — E03.9 HYPOTHYROIDISM, UNSPECIFIED TYPE: ICD-10-CM

## 2017-07-21 DIAGNOSIS — Z51.81 THERAPEUTIC DRUG MONITORING: ICD-10-CM

## 2017-07-21 LAB
ALT SERPL-CCNC: 18 U/L (ref 14–54)
AST SERPL-CCNC: 23 U/L (ref 15–41)
BASOPHILS # BLD: 0.1 K/UL (ref 0–0.2)
BASOPHILS NFR BLD: 1 %
CREAT SERPL-MCNC: 0.86 MG/DL (ref 0.5–1.5)
CRP SERPL-MCNC: <0.5 MG/DL (ref 0–0.9)
EOSINOPHIL # BLD: 0.3 K/UL (ref 0–0.7)
EOSINOPHIL NFR BLD: 6 %
ERYTHROCYTE [DISTWIDTH] IN BLOOD BY AUTOMATED COUNT: 13.9 % (ref 11–15)
ERYTHROCYTE [SEDIMENTATION RATE] IN BLOOD: 11 MM/HR (ref 0–30)
HCT VFR BLD AUTO: 34.9 % (ref 35–48)
HGB BLD-MCNC: 12 G/DL (ref 12–16)
LYMPHOCYTES # BLD: 1.8 K/UL (ref 1–4)
LYMPHOCYTES NFR BLD: 35 %
MCH RBC QN AUTO: 36.3 PG (ref 27–32)
MCHC RBC AUTO-ENTMCNC: 34.3 G/DL (ref 32–37)
MCV RBC AUTO: 106 FL (ref 80–100)
MONOCYTES # BLD: 0.5 K/UL (ref 0–1)
MONOCYTES NFR BLD: 9 %
NEUTROPHILS # BLD AUTO: 2.6 K/UL (ref 1.8–7.7)
NEUTROPHILS NFR BLD: 49 %
PLATELET # BLD AUTO: 172 K/UL (ref 140–400)
PMV BLD AUTO: 8.7 FL (ref 7.4–10.3)
RBC # BLD AUTO: 3.29 M/UL (ref 3.7–5.4)
T4 FREE SERPL-MCNC: 0.81 NG/DL (ref 0.58–1.64)
TSH SERPL-ACNC: 1.23 UIU/ML (ref 0.45–5.33)
WBC # BLD AUTO: 5.2 K/UL (ref 4–11)

## 2017-07-21 PROCEDURE — 85025 COMPLETE CBC W/AUTO DIFF WBC: CPT

## 2017-07-21 PROCEDURE — 85652 RBC SED RATE AUTOMATED: CPT

## 2017-07-21 PROCEDURE — 36415 COLL VENOUS BLD VENIPUNCTURE: CPT

## 2017-07-21 PROCEDURE — 84439 ASSAY OF FREE THYROXINE: CPT

## 2017-07-21 PROCEDURE — 86140 C-REACTIVE PROTEIN: CPT

## 2017-07-21 PROCEDURE — 84443 ASSAY THYROID STIM HORMONE: CPT

## 2017-07-21 PROCEDURE — 82565 ASSAY OF CREATININE: CPT

## 2017-07-21 PROCEDURE — 84460 ALANINE AMINO (ALT) (SGPT): CPT

## 2017-07-21 PROCEDURE — 84450 TRANSFERASE (AST) (SGOT): CPT

## 2017-07-24 NOTE — TELEPHONE ENCOUNTER
LOV: 5/18/17  Last Refilled:#32, 4rfs 3/8/17  Labs:AST 23  ALT 18 7/21/17  Future Appointments  Date Time Provider Guido Hoover   7/27/2017 2:30 PM Samuella Free, MD [facility] [place]   1/19/2018 1:40 PM MD [facility] [place]

## 2017-07-24 NOTE — TELEPHONE ENCOUNTER
LOV: 5/18/17  Future Appointments  Date Time Provider Guido Hoover   7/27/2017 2:30 PM Gardenia Burke, MD [facility] [place]   1/19/2018 1:40 PM MD [facility] [place] [facility]     Labs:   Component      Latest Ref Rng & Units 7/21/2017

## 2017-07-27 ENCOUNTER — TELEPHONE (OUTPATIENT)
Dept: RHEUMATOLOGY | Facility: CLINIC | Age: 72
End: 2017-07-27

## 2017-07-27 ENCOUNTER — OFFICE VISIT (OUTPATIENT)
Dept: RHEUMATOLOGY | Facility: CLINIC | Age: 72
End: 2017-07-27

## 2017-07-27 VITALS
HEART RATE: 81 BPM | HEIGHT: 59 IN | WEIGHT: 180 LBS | SYSTOLIC BLOOD PRESSURE: 123 MMHG | BODY MASS INDEX: 36.29 KG/M2 | DIASTOLIC BLOOD PRESSURE: 70 MMHG

## 2017-07-27 DIAGNOSIS — M79.7 FIBROMYALGIA: ICD-10-CM

## 2017-07-27 DIAGNOSIS — Z51.81 THERAPEUTIC DRUG MONITORING: ICD-10-CM

## 2017-07-27 DIAGNOSIS — M06.9 RHEUMATOID ARTHRITIS INVOLVING MULTIPLE SITES, UNSPECIFIED RHEUMATOID FACTOR PRESENCE: Primary | ICD-10-CM

## 2017-07-27 PROCEDURE — 99214 OFFICE O/P EST MOD 30 MIN: CPT | Performed by: INTERNAL MEDICINE

## 2017-07-27 PROCEDURE — G0463 HOSPITAL OUTPT CLINIC VISIT: HCPCS | Performed by: INTERNAL MEDICINE

## 2017-07-27 RX ORDER — HYDROCODONE BITARTRATE AND ACETAMINOPHEN 10; 325 MG/1; MG/1
1 TABLET ORAL EVERY 12 HOURS PRN
Qty: 60 TABLET | Refills: 0 | Status: SHIPPED | OUTPATIENT
Start: 2017-08-27 | End: 2017-09-26

## 2017-07-27 RX ORDER — LEFLUNOMIDE 10 MG/1
10 TABLET ORAL DAILY
Qty: 30 TABLET | Refills: 2 | Status: SHIPPED | OUTPATIENT
Start: 2017-07-27 | End: 2017-10-10

## 2017-07-27 RX ORDER — HYDROCODONE BITARTRATE AND ACETAMINOPHEN 10; 325 MG/1; MG/1
1 TABLET ORAL EVERY 12 HOURS PRN
Qty: 60 TABLET | Refills: 0 | Status: SHIPPED | OUTPATIENT
Start: 2017-09-27 | End: 2017-10-23

## 2017-07-27 RX ORDER — HYDROCODONE BITARTRATE AND ACETAMINOPHEN 10; 325 MG/1; MG/1
1 TABLET ORAL EVERY 12 HOURS PRN
Qty: 60 TABLET | Refills: 0 | Status: SHIPPED | OUTPATIENT
Start: 2017-07-27 | End: 2017-08-26

## 2017-07-27 NOTE — PATIENT INSTRUCTIONS
1. Cont  Methotrexate 8 tablets a week   2. Info on pt. Assistance for  Kirstin Ariela 11mg a day -   3. Check labs in 2 months. 4. norco 10/325mg #60 with 2 refills -   5. Return to clinic in 2 months  6. Cont.  Calcium 1500mg a day and vit d 800units   7. joyce If you miss a dose, take it as soon as you can. If it is almost time for your next dose, take only that dose. Do not take double or extra doses. Where should I keep my medicine? Keep out of the reach of children.   Store at room temperature between 15 and

## 2017-07-27 NOTE — PROGRESS NOTES
charlotte Combs is a 67year old female. HPI:   Patient presents with:  Rheumatoid Arthritis: 3 month f/u      I had the pleasure of seeing your patient Mrs. Enos Meigs  On 7/27/2017. I had last seen her on 5/18/2017. I had last seen her on 4/27/2017. try the fosamax again. She thinks that if she is dehyrdated she was causing the cramps. This year was the same thing. She thinks it might not be the fosamax. She is hydrating more. 1/272017  Her pain is a lot worse.  She doesn't know if it's the weather bid. Then weaned off and feels better with her stomach. She's gained 30 pounds. lyrica also caused constipation. She's off lyrica. She's still walking every day. She doesn't know where the weight is coming from.  But she has sto walk 2 miles with th mouth daily. Take 2 tabs every day Disp:  Rfl:    simvastatin 5 MG Oral Tab Take 1 tablet (5 mg total) by mouth nightly. Disp: 90 tablet Rfl: 4   Vitamin D3 (VITAMIN D3) 1000 UNITS Oral Tab Take 1 tablet by mouth daily.  Disp: 90 tablet Rfl: 4   Omega-3 Fat %      % 6   Basophils %      % 1   Neutrophils Absolute      1.8 - 7.7 K/UL 2.6   Lymphocytes Absolute      1.0 - 4.0 K/UL 1.8   Monocytes Absolute      0.0 - 1.0 K/UL 0.5   Eosinophils Absolute      0.0 - 0.7 K/UL 0.3   Basophils Absolute      0.0 - 0.2 no benefit in the past with humira, enbrel or orencia.    - back pain and hip and ankle pain while walking - 1 in am and 1/2 in , norco 10mg  prn   - dx with RA in 1990s - likely started mtx in 1995 and prednisone  - hx of enbrel x 3 years, humira x 1 year

## 2017-08-07 RX ORDER — FOLIC ACID 1 MG/1
TABLET ORAL
Qty: 90 TABLET | Refills: 3 | Status: SHIPPED | OUTPATIENT
Start: 2017-08-07 | End: 2018-09-29

## 2017-08-07 NOTE — TELEPHONE ENCOUNTER
LOV: 7/27/17  Last Refilled:#Never filled by you. Future Appointments  Date Time Provider Guido Sneha   9/28/2017 2:30 PM Yaquelin Willis MD [facility] [place] [facility] [place]   1/19/2018 1:40 PM Aureliano Fairchild MD [facility]       Please advise.

## 2017-08-07 NOTE — TELEPHONE ENCOUNTER
PA approved from 5/9/17 to 8/7/18. #[id number]. Pt called and aware script approved. Sent to [facility]. Pt will call office back if there are any issues.

## 2017-08-09 NOTE — TELEPHONE ENCOUNTER
Information given to [facility] JASMYNE MARSHALL.  They will call pt to discuss cost.

## 2017-08-09 NOTE — TELEPHONE ENCOUNTER
[facility] [phone] option 3 states that pt has prior authorization for the 11 milligram of the medication but they received a member request today for 5 milligrams. Ivone Li would like confirmation as to the milligrams.  Please, use reference # [id number]

## 2017-08-15 RX ORDER — SIMVASTATIN 5 MG
TABLET ORAL
Qty: 90 TABLET | Refills: 0 | Status: SHIPPED | OUTPATIENT
Start: 2017-08-15 | End: 2017-12-12

## 2017-08-16 ENCOUNTER — APPOINTMENT (OUTPATIENT)
Dept: LAB | Age: 72
End: 2017-08-16
Attending: INTERNAL MEDICINE
Payer: MEDICARE

## 2017-08-16 ENCOUNTER — TELEPHONE (OUTPATIENT)
Dept: RHEUMATOLOGY | Facility: CLINIC | Age: 72
End: 2017-08-16

## 2017-08-16 DIAGNOSIS — Z51.81 THERAPEUTIC DRUG MONITORING: ICD-10-CM

## 2017-08-16 DIAGNOSIS — M06.9 RHEUMATOID ARTHRITIS, INVOLVING UNSPECIFIED SITE, UNSPECIFIED RHEUMATOID FACTOR PRESENCE: Primary | ICD-10-CM

## 2017-08-16 DIAGNOSIS — M06.9 RHEUMATOID ARTHRITIS, INVOLVING UNSPECIFIED SITE, UNSPECIFIED RHEUMATOID FACTOR PRESENCE: ICD-10-CM

## 2017-08-16 PROCEDURE — 36415 COLL VENOUS BLD VENIPUNCTURE: CPT

## 2017-08-16 PROCEDURE — 86480 TB TEST CELL IMMUN MEASURE: CPT

## 2017-08-16 PROCEDURE — 86803 HEPATITIS C AB TEST: CPT

## 2017-08-16 PROCEDURE — 86704 HEP B CORE ANTIBODY TOTAL: CPT

## 2017-08-16 PROCEDURE — 87340 HEPATITIS B SURFACE AG IA: CPT

## 2017-08-16 NOTE — TELEPHONE ENCOUNTER
Pt. Will need tb test and hep b and c testing   Called pt. And let her know she needs them - she is aware and will get these done this am and tomorrow.

## 2017-08-17 ENCOUNTER — TELEPHONE (OUTPATIENT)
Dept: RHEUMATOLOGY | Facility: CLINIC | Age: 72
End: 2017-08-17

## 2017-08-17 LAB
HBV CORE AB SERPL QL IA: NONREACTIVE
HBV SURFACE AG SERPL QL IA: NONREACTIVE
HCV AB SERPL QL IA: NONREACTIVE

## 2017-08-17 NOTE — TELEPHONE ENCOUNTER
Azul calling on behalf of [employer] regarding medication Tofacitinib Citrate (XELJANZ XR) 11 MG Oral Tablet 24 Hr. Velia Fung is request validation of the prescription . .please advise

## 2017-08-18 LAB
M TB IFN-G CD4+ BCKGRND COR BLD-ACNC: -0.01 IU/ML
M TB IFN-G CD4+ T-CELLS BLD-ACNC: 0.06 IU/ML
M TB TUBERC IFN-G BLD QL: NEGATIVE
M TB TUBERC IGNF/MITOGEN IGNF CONTROL: >10 IU/ML

## 2017-08-22 NOTE — TELEPHONE ENCOUNTER
Pt dropped off pt assistance paperwork. Office will fax to [facility] after provider signs off on form tomorrow.

## 2017-09-20 RX ORDER — ALENDRONATE SODIUM 70 MG/1
TABLET ORAL
Qty: 12 TABLET | Refills: 3 | Status: SHIPPED | OUTPATIENT
Start: 2017-09-20 | End: 2018-08-22

## 2017-09-20 NOTE — TELEPHONE ENCOUNTER
LOV: 7-27-17  Last Refilled: 2rfs, 4/12/17    Future Appointments  Date Time Provider Guido Hoover   9/28/2017 2:30 PM Lauren Roberts MD [facility] [place] [facility] [place]   1/19/2018 1:40 PM Tavia Simeon MD [facility]       Please advise.

## 2017-09-21 RX ORDER — CITALOPRAM 20 MG/1
TABLET ORAL
Qty: 90 TABLET | Refills: 0 | Status: SHIPPED | OUTPATIENT
Start: 2017-09-21 | End: 2018-01-24

## 2017-09-21 NOTE — TELEPHONE ENCOUNTER
Refilled per written protocol.     Refill Protocol Appointment Criteria  for Psychiatric Non-Scheduled (Anti-Anxiety)    · Appointment scheduled in the past 6 months or in the next 3 months  Recent Outpatient Visits            1 month ago Rheumatoid arthrit

## 2017-09-26 RX ORDER — LISINOPRIL 40 MG/1
TABLET ORAL
Qty: 30 TABLET | Refills: 2 | Status: SHIPPED | OUTPATIENT
Start: 2017-09-26 | End: 2018-01-29

## 2017-09-26 NOTE — TELEPHONE ENCOUNTER
Hypertensive Medications  Protocol Criteria:  · Appointment scheduled in the past 6 months or in the next 3 months  · BMP or CMP in the past 12 months  · Creatinine result < 2  Recent Outpatient Visits            2 months ago Rheumatoid arthritis involving

## 2017-09-28 ENCOUNTER — NURSE TRIAGE (OUTPATIENT)
Dept: OTHER | Age: 72
End: 2017-09-28

## 2017-09-28 ENCOUNTER — LAB ENCOUNTER (OUTPATIENT)
Dept: LAB | Age: 72
End: 2017-09-28
Attending: INTERNAL MEDICINE
Payer: MEDICARE

## 2017-09-28 ENCOUNTER — OFFICE VISIT (OUTPATIENT)
Dept: RHEUMATOLOGY | Facility: CLINIC | Age: 72
End: 2017-09-28

## 2017-09-28 VITALS
HEART RATE: 67 BPM | SYSTOLIC BLOOD PRESSURE: 168 MMHG | BODY MASS INDEX: 35.28 KG/M2 | HEIGHT: 59 IN | DIASTOLIC BLOOD PRESSURE: 80 MMHG | TEMPERATURE: 98 F | WEIGHT: 175 LBS

## 2017-09-28 DIAGNOSIS — Z51.81 THERAPEUTIC DRUG MONITORING: ICD-10-CM

## 2017-09-28 DIAGNOSIS — M06.9 RHEUMATOID ARTHRITIS INVOLVING BOTH HIPS, UNSPECIFIED RHEUMATOID FACTOR PRESENCE: Primary | ICD-10-CM

## 2017-09-28 DIAGNOSIS — M06.9 RHEUMATOID ARTHRITIS INVOLVING MULTIPLE SITES, UNSPECIFIED RHEUMATOID FACTOR PRESENCE: ICD-10-CM

## 2017-09-28 DIAGNOSIS — M25.552 LEFT HIP PAIN: ICD-10-CM

## 2017-09-28 DIAGNOSIS — M16.0 BILATERAL PRIMARY OSTEOARTHRITIS OF HIP: ICD-10-CM

## 2017-09-28 PROBLEM — E66.01 SEVERE OBESITY (BMI 35.0-39.9) WITH COMORBIDITY (HCC): Chronic | Status: ACTIVE | Noted: 2017-09-28

## 2017-09-28 LAB
ALT SERPL-CCNC: 29 U/L (ref 14–54)
AST SERPL-CCNC: 35 U/L (ref 15–41)
BASOPHILS # BLD: 0 K/UL (ref 0–0.2)
BASOPHILS NFR BLD: 1 %
CREAT SERPL-MCNC: 0.85 MG/DL (ref 0.5–1.5)
CRP SERPL-MCNC: 0.6 MG/DL (ref 0–0.9)
EOSINOPHIL # BLD: 0.2 K/UL (ref 0–0.7)
EOSINOPHIL NFR BLD: 4 %
ERYTHROCYTE [DISTWIDTH] IN BLOOD BY AUTOMATED COUNT: 14.1 % (ref 11–15)
ERYTHROCYTE [SEDIMENTATION RATE] IN BLOOD: 10 MM/HR (ref 0–30)
HCT VFR BLD AUTO: 35.2 % (ref 35–48)
HGB BLD-MCNC: 12.3 G/DL (ref 12–16)
LYMPHOCYTES # BLD: 1.7 K/UL (ref 1–4)
LYMPHOCYTES NFR BLD: 38 %
MCH RBC QN AUTO: 34.7 PG (ref 27–32)
MCHC RBC AUTO-ENTMCNC: 34.8 G/DL (ref 32–37)
MCV RBC AUTO: 99.5 FL (ref 80–100)
MONOCYTES # BLD: 0.5 K/UL (ref 0–1)
MONOCYTES NFR BLD: 10 %
NEUTROPHILS # BLD AUTO: 2.2 K/UL (ref 1.8–7.7)
NEUTROPHILS NFR BLD: 47 %
PLATELET # BLD AUTO: 163 K/UL (ref 140–400)
PMV BLD AUTO: 9.5 FL (ref 7.4–10.3)
RBC # BLD AUTO: 3.54 M/UL (ref 3.7–5.4)
WBC # BLD AUTO: 4.6 K/UL (ref 4–11)

## 2017-09-28 PROCEDURE — 36415 COLL VENOUS BLD VENIPUNCTURE: CPT

## 2017-09-28 PROCEDURE — 85652 RBC SED RATE AUTOMATED: CPT

## 2017-09-28 PROCEDURE — 84450 TRANSFERASE (AST) (SGOT): CPT

## 2017-09-28 PROCEDURE — 86140 C-REACTIVE PROTEIN: CPT

## 2017-09-28 PROCEDURE — 85025 COMPLETE CBC W/AUTO DIFF WBC: CPT

## 2017-09-28 PROCEDURE — 84460 ALANINE AMINO (ALT) (SGPT): CPT

## 2017-09-28 PROCEDURE — G0463 HOSPITAL OUTPT CLINIC VISIT: HCPCS | Performed by: INTERNAL MEDICINE

## 2017-09-28 PROCEDURE — 20610 DRAIN/INJ JOINT/BURSA W/O US: CPT | Performed by: INTERNAL MEDICINE

## 2017-09-28 PROCEDURE — 99214 OFFICE O/P EST MOD 30 MIN: CPT | Performed by: INTERNAL MEDICINE

## 2017-09-28 PROCEDURE — 82565 ASSAY OF CREATININE: CPT

## 2017-09-28 RX ORDER — TRIAMCINOLONE ACETONIDE 40 MG/ML
40 INJECTION, SUSPENSION INTRA-ARTICULAR; INTRAMUSCULAR ONCE
Status: COMPLETED | OUTPATIENT
Start: 2017-09-28 | End: 2017-09-28

## 2017-09-28 RX ORDER — HYDROCODONE BITARTRATE AND ACETAMINOPHEN 10; 325 MG/1; MG/1
1 TABLET ORAL EVERY 12 HOURS PRN
Qty: 60 TABLET | Refills: 0 | Status: SHIPPED | OUTPATIENT
Start: 2017-10-28 | End: 2017-10-12

## 2017-09-28 RX ORDER — HYDROCODONE BITARTRATE AND ACETAMINOPHEN 10; 325 MG/1; MG/1
1 TABLET ORAL EVERY 6 HOURS PRN
Qty: 30 TABLET | Refills: 0 | Status: SHIPPED | OUTPATIENT
Start: 2017-11-27 | End: 2017-10-12

## 2017-09-28 RX ORDER — HYDROCODONE BITARTRATE AND ACETAMINOPHEN 10; 325 MG/1; MG/1
1 TABLET ORAL EVERY 6 HOURS PRN
Qty: 30 TABLET | Refills: 0 | Status: SHIPPED | OUTPATIENT
Start: 2017-10-28 | End: 2017-10-12

## 2017-09-28 NOTE — PROCEDURES
With paitent's consent, I injected pt's left   trochanteric bursa  with 1ml lidocaine 1 % and 1 ml kenalog 40. It was done under sterile technique using iodine and alcohol swabs and ethyl chloride was used as an anaesthetic spray. Pt.  tolerated it well.

## 2017-09-28 NOTE — PATIENT INSTRUCTIONS
1. Cont  Methotrexate 8 tablets a week   2.  xeljanz 11mg a day -   3. Check labs today   4. norco 10/325mg #60 with 2 refills -   5. Return to clinic in 2 months  6. Cont.  Calcium 1500mg a day and vit d 800units   7. alendroante (fosamax)  70mg a week   8

## 2017-09-28 NOTE — TELEPHONE ENCOUNTER
Action Requested: Summary for Provider     []  Critical Lab, Recommendations Needed  [] Need Additional Advice  [x]   FYI    [x]   Need Orders  [] Need Medications Sent to Pharmacy  []  Other     SUMMARY: Pt declines OV; states she already knows this pain

## 2017-09-28 NOTE — PROGRESS NOTES
Cathy Crowe is a 67year old female. HPI:   Patient presents with:  Rheumatoid Arthritis      I had the pleasure of seeing your patient Mrs. Reagan Horner  On 9/28/2017. Ih ad last seen her on 7/27/2017. I had last seen her on 5/18/2017.  I had last se but she would like to try the fosamax again. She thinks that if she is dehyrdated she was causing the cramps. This year was the same thing. She thinks it might not be the fosamax. She is hydrating more. 1/272017  Her pain is a lot worse.  She doesn't kn went down to ssz 1000mg bid. Then weaned off and feels better with her stomach. She's gained 30 pounds. lyrica also caused constipation. She's off lyrica. She's still walking every day. She doesn't know where the weight is coming from.  But she has TABLET BY MOUTH ONCE DAILY Disp: 90 tablet Rfl: 0   ALENDRONATE SODIUM 70 MG Oral Tab TAKE ONE TABLET BY MOUTH ONCE A WEEK Disp: 12 tablet Rfl: 3   SIMVASTATIN 5 MG Oral Tab TAKE 1 TABLET BY MOUTH NIGHTLY.  CANCEL THE CRESTOR Disp: 90 tablet Rfl: 0   FOLIC murmurs  RS: CTAB, no crackles, no rhonchi  ABD: Soft Non tender,   Joint exam:   tendernesss in left gluteal areas and mid back - left trochanteric bursitis    Minimal ext rotation of hips  - left is minimal - - tender in groins b/l if rotating -   b/l sh old fracture left ulnar styloid. No erosions. DEXA 4/28/2016  LEFT FEMORAL NECK       BMD:  0.474 gm/sq. cm.    T SCORE: -3.4      Change since previous:  14% decrease     PA LUMBAR SPINE (L1 - L4)       BMD:  0.899 gm/sq.  cm.    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 - she is declining prolia and reclast after reading about it.    - d/w her about calcium with vitamin D     3. hypothryoidism - was on levothryoixine - ch

## 2017-10-05 ENCOUNTER — MED REC SCAN ONLY (OUTPATIENT)
Dept: RHEUMATOLOGY | Facility: CLINIC | Age: 72
End: 2017-10-05

## 2017-10-10 PROBLEM — M25.552 HIP PAIN, ACUTE, LEFT: Status: ACTIVE | Noted: 2017-10-10

## 2017-10-10 PROBLEM — M16.12 ARTHRITIS OF LEFT HIP: Status: ACTIVE | Noted: 2017-10-10

## 2017-10-12 ENCOUNTER — HOSPITAL ENCOUNTER (OUTPATIENT)
Dept: MRI IMAGING | Age: 72
Discharge: HOME | End: 2017-10-12
Attending: ORTHOPAEDIC SURGERY
Payer: MEDICARE

## 2017-10-12 ENCOUNTER — LAB ENCOUNTER (OUTPATIENT)
Dept: LAB | Age: 72
End: 2017-10-12
Attending: ORTHOPAEDIC SURGERY
Payer: MEDICARE

## 2017-10-12 ENCOUNTER — OFFICE VISIT (OUTPATIENT)
Dept: FAMILY MEDICINE CLINIC | Facility: CLINIC | Age: 72
End: 2017-10-12

## 2017-10-12 VITALS
WEIGHT: 173.19 LBS | SYSTOLIC BLOOD PRESSURE: 116 MMHG | DIASTOLIC BLOOD PRESSURE: 63 MMHG | HEART RATE: 69 BPM | HEIGHT: 59.5 IN | BODY MASS INDEX: 34.45 KG/M2

## 2017-10-12 DIAGNOSIS — E66.01 SEVERE OBESITY (BMI 35.0-39.9) WITH COMORBIDITY (HCC): Chronic | ICD-10-CM

## 2017-10-12 DIAGNOSIS — N39.41 URGE INCONTINENCE: ICD-10-CM

## 2017-10-12 DIAGNOSIS — M25.552 HIP PAIN, ACUTE, LEFT: ICD-10-CM

## 2017-10-12 DIAGNOSIS — E03.9 HYPOTHYROIDISM, UNSPECIFIED TYPE: ICD-10-CM

## 2017-10-12 DIAGNOSIS — I70.0 ATHEROSCLEROSIS OF AORTA (HCC): Primary | ICD-10-CM

## 2017-10-12 DIAGNOSIS — M06.9 RHEUMATOID ARTHRITIS INVOLVING LEFT HIP, UNSPECIFIED RHEUMATOID FACTOR PRESENCE: ICD-10-CM

## 2017-10-12 PROCEDURE — 36415 COLL VENOUS BLD VENIPUNCTURE: CPT

## 2017-10-12 PROCEDURE — 73721 MRI JNT OF LWR EXTRE W/O DYE: CPT | Performed by: ORTHOPAEDIC SURGERY

## 2017-10-12 PROCEDURE — 99213 OFFICE O/P EST LOW 20 MIN: CPT | Performed by: FAMILY MEDICINE

## 2017-10-12 PROCEDURE — 87086 URINE CULTURE/COLONY COUNT: CPT

## 2017-10-12 PROCEDURE — 84439 ASSAY OF FREE THYROXINE: CPT

## 2017-10-12 PROCEDURE — G0463 HOSPITAL OUTPT CLINIC VISIT: HCPCS | Performed by: FAMILY MEDICINE

## 2017-10-12 PROCEDURE — 81001 URINALYSIS AUTO W/SCOPE: CPT

## 2017-10-12 PROCEDURE — 84443 ASSAY THYROID STIM HORMONE: CPT

## 2017-10-12 RX ORDER — OXYBUTYNIN CHLORIDE 5 MG/1
5 TABLET, EXTENDED RELEASE ORAL DAILY
Qty: 90 TABLET | Refills: 1 | Status: SHIPPED | OUTPATIENT
Start: 2017-10-12 | End: 2017-10-31

## 2017-10-12 NOTE — PROGRESS NOTES
Anthony Heart is a 67year old female. Patient presents with:  Hyperlipidemia  Hypertension    HPI:   Just saw Dr. Merlin Amaya for bad hip pain. May need to have a hip replacement.  Taking her medications regularly   Having problems with incontinence - has s mouth. Take 1 cap every day Disp:  Rfl:    aspirin 81 MG Oral Tab Take  by mouth. Disp:  Rfl:    famotidine (PEPCID) 10 MG Oral Tab Take  by mouth. Disp:  Rfl:    Multiple Vitamin (MULTI-VITAMINS) Oral Tab Take  by mouth.  Disp:  Rfl:      No current facili contractures    ASSESSMENT AND PLAN:   1. Atherosclerosis of aorta ([address]  Pt is on simvastatin    2. Rheumatoid arthritis involving left hip, unspecified rheumatoid factor presence ([address]  Per rheum. Seeing ortho now also for OA of the hip.      3. Severe ob

## 2017-10-19 ENCOUNTER — TELEPHONE (OUTPATIENT)
Dept: RHEUMATOLOGY | Facility: CLINIC | Age: 72
End: 2017-10-19

## 2017-10-19 NOTE — TELEPHONE ENCOUNTER
Spoke with pt and given appt for 10/23 at 11:20 am at the [facility].

## 2017-10-19 NOTE — TELEPHONE ENCOUNTER
Pt states that she is scheduled to have hip surgery on 11-10-17 and was told to make an appt to see Dr. Farzad Sprague before that date. Pt would like to see Dr. Farzad Sprague this week or next. There are no available appointments.

## 2017-10-23 ENCOUNTER — OFFICE VISIT (OUTPATIENT)
Dept: RHEUMATOLOGY | Facility: CLINIC | Age: 72
End: 2017-10-23

## 2017-10-23 VITALS
WEIGHT: 174 LBS | HEART RATE: 63 BPM | DIASTOLIC BLOOD PRESSURE: 70 MMHG | BODY MASS INDEX: 34.62 KG/M2 | SYSTOLIC BLOOD PRESSURE: 112 MMHG | HEIGHT: 59.5 IN

## 2017-10-23 DIAGNOSIS — Z51.81 THERAPEUTIC DRUG MONITORING: ICD-10-CM

## 2017-10-23 DIAGNOSIS — M06.9 RHEUMATOID ARTHRITIS INVOLVING BOTH HIPS, UNSPECIFIED RHEUMATOID FACTOR PRESENCE: Primary | ICD-10-CM

## 2017-10-23 DIAGNOSIS — M25.552 LEFT HIP PAIN: ICD-10-CM

## 2017-10-23 PROCEDURE — G0463 HOSPITAL OUTPT CLINIC VISIT: HCPCS | Performed by: INTERNAL MEDICINE

## 2017-10-23 PROCEDURE — 99213 OFFICE O/P EST LOW 20 MIN: CPT | Performed by: INTERNAL MEDICINE

## 2017-10-23 RX ORDER — HYDROCODONE BITARTRATE AND ACETAMINOPHEN 10; 325 MG/1; MG/1
1 TABLET ORAL EVERY 12 HOURS PRN
Qty: 60 TABLET | Refills: 0 | Status: SHIPPED | OUTPATIENT
Start: 2017-11-30 | End: 2017-11-13

## 2017-10-23 NOTE — PROGRESS NOTES
Nury Flaherty is a 67year old female. HPI:   Patient presents with:  Rheumatoid Arthritis: left hip pain      I had the pleasure of seeing your patient Mrs. Sommer Brooke  On 10/23/2017. I had last seen her on 9/28/2017.  Ih ad last seen her on 7/27/201 to 1500mg daily. She thought about the prolia - but she would like to try the fosamax again. She thinks that if she is dehyrdated she was causing the cramps. This year was the same thing. She thinks it might not be the fosamax. She is hydrating more. and had heartburn. She had consitpation. So she went down to ssz 1000mg bid. Then weaned off and feels better with her stomach. She's gained 30 pounds. lyrica also caused constipation. She's off lyrica. She's still walking every day.    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 Disp:  Rfl:    famotidine (PEPCID) 10 MG Oral Tab Take  by mouth. Disp:  Rfl:    Multiple Vitamin (MULTI-VITAMINS) Oral Tab Take  by mouth.  Disp:  Rfl:        Allergies:    Codeine                 Other (See Commen 1+   POLYCHROMASIA       1+   CREATININE      0.50 - 1.50 mg/dL 0.86   GFR, Non-      >=60 >60   GFR, -American      >=60 >60   T4,Free (Direct)      0.58 - 1.64 ng/dL 0.81   TSH      0.45 - 5.33 uIU/mL 1.23   ALT (SGPT)      14 - 54 prednisone and doesn't want to restart this. -  - cont. biotin   - She had no benefit in the past with humira, enbrel or orencia.    - back pain and hip and ankle pain while walking - 1 in am and 1/2 in , norco 10mg  prn   - dx with RA in 1990s - likely sta

## 2017-10-26 ENCOUNTER — HOSPITAL ENCOUNTER (OUTPATIENT)
Dept: GENERAL RADIOLOGY | Age: 72
Discharge: HOME | End: 2017-10-26
Attending: ORTHOPAEDIC SURGERY
Payer: MEDICARE

## 2017-10-26 DIAGNOSIS — Z01.818: ICD-10-CM

## 2017-10-26 DIAGNOSIS — M05.9 RHEUMATOID ARTHRITIS WITH POSITIVE RHEUMATOID FACTOR, INVOLVING UNSPECIFIED SITE (HCC): ICD-10-CM

## 2017-10-26 DIAGNOSIS — Z91.89: ICD-10-CM

## 2017-10-26 PROCEDURE — 72050 X-RAY EXAM NECK SPINE 4/5VWS: CPT | Performed by: ORTHOPAEDIC SURGERY

## 2017-10-29 PROBLEM — M25.552 LEFT HIP PAIN: Status: ACTIVE | Noted: 2017-10-10

## 2017-10-29 PROBLEM — Z01.818 PRE-OP TESTING: Status: ACTIVE | Noted: 2017-10-29

## 2017-10-31 ENCOUNTER — OFFICE VISIT (OUTPATIENT)
Dept: FAMILY MEDICINE CLINIC | Facility: CLINIC | Age: 72
End: 2017-10-31

## 2017-10-31 VITALS
WEIGHT: 175.38 LBS | HEART RATE: 71 BPM | DIASTOLIC BLOOD PRESSURE: 65 MMHG | BODY MASS INDEX: 35 KG/M2 | SYSTOLIC BLOOD PRESSURE: 131 MMHG

## 2017-10-31 DIAGNOSIS — I35.1 MILD AORTIC REGURGITATION: ICD-10-CM

## 2017-10-31 DIAGNOSIS — M16.12 ARTHRITIS OF LEFT HIP: Primary | ICD-10-CM

## 2017-10-31 DIAGNOSIS — E03.9 HYPOTHYROIDISM, UNSPECIFIED TYPE: ICD-10-CM

## 2017-10-31 DIAGNOSIS — Z01.818 PREOPERATIVE CLEARANCE: ICD-10-CM

## 2017-10-31 DIAGNOSIS — N39.41 URGE INCONTINENCE: ICD-10-CM

## 2017-10-31 DIAGNOSIS — E78.2 MIXED HYPERLIPIDEMIA: ICD-10-CM

## 2017-10-31 DIAGNOSIS — I70.0 ATHEROSCLEROSIS OF AORTA (HCC): ICD-10-CM

## 2017-10-31 DIAGNOSIS — M06.9 RHEUMATOID ARTHRITIS OF HIP, UNSPECIFIED LATERALITY, UNSPECIFIED RHEUMATOID FACTOR PRESENCE: ICD-10-CM

## 2017-10-31 PROCEDURE — 99214 OFFICE O/P EST MOD 30 MIN: CPT | Performed by: FAMILY MEDICINE

## 2017-10-31 PROCEDURE — G0463 HOSPITAL OUTPT CLINIC VISIT: HCPCS | Performed by: FAMILY MEDICINE

## 2017-10-31 RX ORDER — OXYBUTYNIN CHLORIDE 15 MG/1
15 TABLET, EXTENDED RELEASE ORAL DAILY
Qty: 90 TABLET | Refills: 1 | Status: SHIPPED | OUTPATIENT
Start: 2017-10-31 | End: 2018-06-15

## 2017-10-31 NOTE — PROGRESS NOTES
HPI:   Kenisha Chiang is a 67year old female who presents for a complete physical exam for preoperative clearance for left hip replacement. Pt here for preoperative clearance per Dr. Ellie Wallace request for surgery 11/10/2017.    Last surgery was in 2012 MOUTH ONCE DAILY AT BEDTIME AS NEEDED Disp: 90 tablet Rfl: 1   Potassium 99 MG Oral Tab Take 1 tablet by mouth daily. Disp:  Rfl:    Calcium 600 MG Oral Tab Take 1,200 mg by mouth daily.  Take 2 tabs every day Disp:  Rfl:    Vitamin D3 (VITAMIN D3) 1000 UNI Comment: socially          REVIEW OF SYSTEMS:   GENERAL: feels well otherwise  SKIN: denies any skin lesions  EYES:denies blurred vision or double vision  HEENT: denies nasal congestion, sinus pain or ST  LUNGS: denies shortness of breath with exertion, de regurgitation. Normal EF. No symptoms. 7. Rheumatoid arthritis of hip, unspecified laterality, unspecified rheumatoid factor presence (HCC)    - RHEUMATOLOGY - INTERNAL    8. Urge incontinence  Increasing oxybutynin to 15 mg.             Chares Kehr

## 2017-11-02 ENCOUNTER — LAB ENCOUNTER (OUTPATIENT)
Dept: LAB | Age: 72
End: 2017-11-02
Attending: ORTHOPAEDIC SURGERY
Payer: MEDICARE

## 2017-11-02 ENCOUNTER — APPOINTMENT (OUTPATIENT)
Dept: LAB | Age: 72
End: 2017-11-02
Attending: ORTHOPAEDIC SURGERY
Payer: MEDICARE

## 2017-11-02 ENCOUNTER — HOSPITAL ENCOUNTER (OUTPATIENT)
Dept: GENERAL RADIOLOGY | Age: 72
Discharge: HOME | End: 2017-11-02
Attending: ORTHOPAEDIC SURGERY
Payer: MEDICARE

## 2017-11-02 DIAGNOSIS — Z01.818 PRE-OP TESTING: ICD-10-CM

## 2017-11-02 PROCEDURE — 93010 ELECTROCARDIOGRAM REPORT: CPT | Performed by: ORTHOPAEDIC SURGERY

## 2017-11-02 PROCEDURE — 87641 MR-STAPH DNA AMP PROBE: CPT

## 2017-11-02 PROCEDURE — 80053 COMPREHEN METABOLIC PANEL: CPT

## 2017-11-02 PROCEDURE — 86920 COMPATIBILITY TEST SPIN: CPT

## 2017-11-02 PROCEDURE — 81003 URINALYSIS AUTO W/O SCOPE: CPT

## 2017-11-02 PROCEDURE — 86901 BLOOD TYPING SEROLOGIC RH(D): CPT

## 2017-11-02 PROCEDURE — 85730 THROMBOPLASTIN TIME PARTIAL: CPT

## 2017-11-02 PROCEDURE — 86850 RBC ANTIBODY SCREEN: CPT

## 2017-11-02 PROCEDURE — 71020 XR CHEST PA + LAT CHEST (CPT=71020): CPT | Performed by: ORTHOPAEDIC SURGERY

## 2017-11-02 PROCEDURE — 93005 ELECTROCARDIOGRAM TRACING: CPT

## 2017-11-02 PROCEDURE — 85610 PROTHROMBIN TIME: CPT

## 2017-11-02 PROCEDURE — 85025 COMPLETE CBC W/AUTO DIFF WBC: CPT

## 2017-11-02 PROCEDURE — 36415 COLL VENOUS BLD VENIPUNCTURE: CPT

## 2017-11-02 PROCEDURE — 86900 BLOOD TYPING SEROLOGIC ABO: CPT

## 2017-11-03 RX ORDER — LEVOTHYROXINE SODIUM 0.1 MG/1
100 TABLET ORAL
COMMUNITY
End: 2018-08-02

## 2017-11-07 ENCOUNTER — TELEPHONE (OUTPATIENT)
Dept: FAMILY MEDICINE CLINIC | Facility: CLINIC | Age: 72
End: 2017-11-07

## 2017-11-07 ENCOUNTER — MED REC SCAN ONLY (OUTPATIENT)
Dept: RHEUMATOLOGY | Facility: CLINIC | Age: 72
End: 2017-11-07

## 2017-11-07 DIAGNOSIS — D64.9 ANEMIA, UNSPECIFIED TYPE: Primary | ICD-10-CM

## 2017-11-10 ENCOUNTER — APPOINTMENT (OUTPATIENT)
Dept: GENERAL RADIOLOGY | Facility: HOSPITAL | Age: 72
DRG: 470 | End: 2017-11-10
Attending: ORTHOPAEDIC SURGERY
Payer: MEDICARE

## 2017-11-10 ENCOUNTER — ANESTHESIA (OUTPATIENT)
Dept: SURGERY | Facility: HOSPITAL | Age: 72
DRG: 470 | End: 2017-11-10
Payer: MEDICARE

## 2017-11-10 ENCOUNTER — SURGERY (OUTPATIENT)
Age: 72
End: 2017-11-10

## 2017-11-10 ENCOUNTER — HOSPITAL ENCOUNTER (INPATIENT)
Facility: HOSPITAL | Age: 72
LOS: 3 days | Discharge: NURSING, REHAB OR LONG-TERM CARE | DRG: 470 | End: 2017-11-13
Attending: ORTHOPAEDIC SURGERY | Admitting: ORTHOPAEDIC SURGERY
Payer: MEDICARE

## 2017-11-10 ENCOUNTER — ANESTHESIA EVENT (OUTPATIENT)
Dept: SURGERY | Facility: HOSPITAL | Age: 72
DRG: 470 | End: 2017-11-10
Payer: MEDICARE

## 2017-11-10 DIAGNOSIS — M16.12 ARTHRITIS OF LEFT HIP: Primary | ICD-10-CM

## 2017-11-10 PROCEDURE — 73501 X-RAY EXAM HIP UNI 1 VIEW: CPT | Performed by: ORTHOPAEDIC SURGERY

## 2017-11-10 PROCEDURE — 99232 SBSQ HOSP IP/OBS MODERATE 35: CPT | Performed by: HOSPITALIST

## 2017-11-10 PROCEDURE — 0SRB0J9 REPLACEMENT OF LEFT HIP JOINT WITH SYNTHETIC SUBSTITUTE, CEMENTED, OPEN APPROACH: ICD-10-PCS | Performed by: ORTHOPAEDIC SURGERY

## 2017-11-10 DEVICE — CEMENT BONE ZIM PALICOS R +G: Type: IMPLANTABLE DEVICE | Site: HIP | Status: FUNCTIONAL

## 2017-11-10 RX ORDER — ONDANSETRON 2 MG/ML
4 INJECTION INTRAMUSCULAR; INTRAVENOUS ONCE AS NEEDED
Status: DISCONTINUED | OUTPATIENT
Start: 2017-11-10 | End: 2017-11-10 | Stop reason: HOSPADM

## 2017-11-10 RX ORDER — MORPHINE SULFATE 4 MG/ML
4 INJECTION, SOLUTION INTRAMUSCULAR; INTRAVENOUS EVERY 10 MIN PRN
Status: DISCONTINUED | OUTPATIENT
Start: 2017-11-10 | End: 2017-11-10 | Stop reason: HOSPADM

## 2017-11-10 RX ORDER — DIPHENHYDRAMINE HYDROCHLORIDE 50 MG/ML
25 INJECTION INTRAMUSCULAR; INTRAVENOUS ONCE AS NEEDED
Status: ACTIVE | OUTPATIENT
Start: 2017-11-10 | End: 2017-11-10

## 2017-11-10 RX ORDER — NEOSTIGMINE METHYLSULFATE 0.5 MG/ML
INJECTION INTRAVENOUS AS NEEDED
Status: DISCONTINUED | OUTPATIENT
Start: 2017-11-10 | End: 2017-11-10 | Stop reason: SURG

## 2017-11-10 RX ORDER — OXYCODONE HYDROCHLORIDE AND ACETAMINOPHEN 5; 325 MG/1; MG/1
2 TABLET ORAL EVERY 4 HOURS PRN
Status: DISCONTINUED | OUTPATIENT
Start: 2017-11-10 | End: 2017-11-13

## 2017-11-10 RX ORDER — MORPHINE SULFATE 2 MG/ML
2 INJECTION, SOLUTION INTRAMUSCULAR; INTRAVENOUS EVERY 10 MIN PRN
Status: DISCONTINUED | OUTPATIENT
Start: 2017-11-10 | End: 2017-11-10 | Stop reason: HOSPADM

## 2017-11-10 RX ORDER — DIPHENHYDRAMINE HYDROCHLORIDE 50 MG/ML
12.5 INJECTION INTRAMUSCULAR; INTRAVENOUS EVERY 4 HOURS PRN
Status: DISCONTINUED | OUTPATIENT
Start: 2017-11-10 | End: 2017-11-13

## 2017-11-10 RX ORDER — FOLIC ACID 1 MG/1
1 TABLET ORAL DAILY
Status: DISCONTINUED | OUTPATIENT
Start: 2017-11-11 | End: 2017-11-13

## 2017-11-10 RX ORDER — HYDROMORPHONE HYDROCHLORIDE 1 MG/ML
0.8 INJECTION, SOLUTION INTRAMUSCULAR; INTRAVENOUS; SUBCUTANEOUS EVERY 2 HOUR PRN
Status: DISCONTINUED | OUTPATIENT
Start: 2017-11-10 | End: 2017-11-13

## 2017-11-10 RX ORDER — POLYETHYLENE GLYCOL 3350 17 G/17G
17 POWDER, FOR SOLUTION ORAL DAILY PRN
Status: DISCONTINUED | OUTPATIENT
Start: 2017-11-10 | End: 2017-11-13

## 2017-11-10 RX ORDER — SODIUM CHLORIDE, SODIUM LACTATE, POTASSIUM CHLORIDE, CALCIUM CHLORIDE 600; 310; 30; 20 MG/100ML; MG/100ML; MG/100ML; MG/100ML
INJECTION, SOLUTION INTRAVENOUS CONTINUOUS
Status: DISCONTINUED | OUTPATIENT
Start: 2017-11-10 | End: 2017-11-13

## 2017-11-10 RX ORDER — ACETAMINOPHEN 500 MG
1000 TABLET ORAL ONCE
Status: COMPLETED | OUTPATIENT
Start: 2017-11-10 | End: 2017-11-10

## 2017-11-10 RX ORDER — NALOXONE HYDROCHLORIDE 0.4 MG/ML
80 INJECTION, SOLUTION INTRAMUSCULAR; INTRAVENOUS; SUBCUTANEOUS AS NEEDED
Status: DISCONTINUED | OUTPATIENT
Start: 2017-11-10 | End: 2017-11-10 | Stop reason: HOSPADM

## 2017-11-10 RX ORDER — GLYCOPYRROLATE 0.2 MG/ML
INJECTION INTRAMUSCULAR; INTRAVENOUS AS NEEDED
Status: DISCONTINUED | OUTPATIENT
Start: 2017-11-10 | End: 2017-11-10 | Stop reason: SURG

## 2017-11-10 RX ORDER — ROCURONIUM BROMIDE 10 MG/ML
INJECTION, SOLUTION INTRAVENOUS AS NEEDED
Status: DISCONTINUED | OUTPATIENT
Start: 2017-11-10 | End: 2017-11-10 | Stop reason: SURG

## 2017-11-10 RX ORDER — SODIUM CHLORIDE 0.9 % (FLUSH) 0.9 %
10 SYRINGE (ML) INJECTION AS NEEDED
Status: DISCONTINUED | OUTPATIENT
Start: 2017-11-10 | End: 2017-11-12

## 2017-11-10 RX ORDER — LABETALOL HYDROCHLORIDE 5 MG/ML
INJECTION, SOLUTION INTRAVENOUS AS NEEDED
Status: DISCONTINUED | OUTPATIENT
Start: 2017-11-10 | End: 2017-11-10 | Stop reason: SURG

## 2017-11-10 RX ORDER — LEVOTHYROXINE SODIUM 0.1 MG/1
100 TABLET ORAL
Status: DISCONTINUED | OUTPATIENT
Start: 2017-11-11 | End: 2017-11-13

## 2017-11-10 RX ORDER — NALBUPHINE HCL 10 MG/ML
2.5 AMPUL (ML) INJECTION EVERY 4 HOURS PRN
Status: DISCONTINUED | OUTPATIENT
Start: 2017-11-10 | End: 2017-11-13

## 2017-11-10 RX ORDER — HYDROMORPHONE HYDROCHLORIDE 1 MG/ML
0.2 INJECTION, SOLUTION INTRAMUSCULAR; INTRAVENOUS; SUBCUTANEOUS EVERY 5 MIN PRN
Status: DISCONTINUED | OUTPATIENT
Start: 2017-11-10 | End: 2017-11-10 | Stop reason: HOSPADM

## 2017-11-10 RX ORDER — FAMOTIDINE 10 MG/ML
20 INJECTION, SOLUTION INTRAVENOUS 2 TIMES DAILY
Status: DISCONTINUED | OUTPATIENT
Start: 2017-11-10 | End: 2017-11-13

## 2017-11-10 RX ORDER — BISACODYL 10 MG
10 SUPPOSITORY, RECTAL RECTAL
Status: DISCONTINUED | OUTPATIENT
Start: 2017-11-10 | End: 2017-11-13

## 2017-11-10 RX ORDER — ZOLPIDEM TARTRATE 5 MG/1
5 TABLET ORAL NIGHTLY PRN
Status: DISCONTINUED | OUTPATIENT
Start: 2017-11-10 | End: 2017-11-13

## 2017-11-10 RX ORDER — MELATONIN
325
Status: DISCONTINUED | OUTPATIENT
Start: 2017-11-11 | End: 2017-11-13

## 2017-11-10 RX ORDER — OXYCODONE HYDROCHLORIDE AND ACETAMINOPHEN 5; 325 MG/1; MG/1
1 TABLET ORAL EVERY 4 HOURS PRN
Status: DISCONTINUED | OUTPATIENT
Start: 2017-11-10 | End: 2017-11-13

## 2017-11-10 RX ORDER — FAMOTIDINE 10 MG
10 TABLET ORAL DAILY
Status: DISCONTINUED | OUTPATIENT
Start: 2017-11-11 | End: 2017-11-13

## 2017-11-10 RX ORDER — LISINOPRIL 40 MG/1
40 TABLET ORAL DAILY
Status: DISCONTINUED | OUTPATIENT
Start: 2017-11-11 | End: 2017-11-13

## 2017-11-10 RX ORDER — HYDROCODONE BITARTRATE AND ACETAMINOPHEN 5; 325 MG/1; MG/1
1 TABLET ORAL AS NEEDED
Status: DISCONTINUED | OUTPATIENT
Start: 2017-11-10 | End: 2017-11-10 | Stop reason: HOSPADM

## 2017-11-10 RX ORDER — HYDROMORPHONE HYDROCHLORIDE 1 MG/ML
0.4 INJECTION, SOLUTION INTRAMUSCULAR; INTRAVENOUS; SUBCUTANEOUS EVERY 5 MIN PRN
Status: DISCONTINUED | OUTPATIENT
Start: 2017-11-10 | End: 2017-11-10 | Stop reason: HOSPADM

## 2017-11-10 RX ORDER — MORPHINE SULFATE 10 MG/ML
6 INJECTION, SOLUTION INTRAMUSCULAR; INTRAVENOUS EVERY 10 MIN PRN
Status: DISCONTINUED | OUTPATIENT
Start: 2017-11-10 | End: 2017-11-10 | Stop reason: HOSPADM

## 2017-11-10 RX ORDER — CYCLOBENZAPRINE HCL 5 MG
5 TABLET ORAL EVERY 8 HOURS PRN
Status: DISCONTINUED | OUTPATIENT
Start: 2017-11-10 | End: 2017-11-13

## 2017-11-10 RX ORDER — METOCLOPRAMIDE HYDROCHLORIDE 5 MG/ML
10 INJECTION INTRAMUSCULAR; INTRAVENOUS EVERY 6 HOURS PRN
Status: ACTIVE | OUTPATIENT
Start: 2017-11-10 | End: 2017-11-12

## 2017-11-10 RX ORDER — EPHEDRINE SULFATE 50 MG/ML
INJECTION, SOLUTION INTRAVENOUS AS NEEDED
Status: DISCONTINUED | OUTPATIENT
Start: 2017-11-10 | End: 2017-11-10 | Stop reason: SURG

## 2017-11-10 RX ORDER — DIPHENHYDRAMINE HCL 25 MG
25 CAPSULE ORAL EVERY 4 HOURS PRN
Status: DISCONTINUED | OUTPATIENT
Start: 2017-11-10 | End: 2017-11-13

## 2017-11-10 RX ORDER — SODIUM PHOSPHATE, DIBASIC AND SODIUM PHOSPHATE, MONOBASIC 7; 19 G/133ML; G/133ML
1 ENEMA RECTAL ONCE AS NEEDED
Status: DISCONTINUED | OUTPATIENT
Start: 2017-11-10 | End: 2017-11-13

## 2017-11-10 RX ORDER — ONDANSETRON 2 MG/ML
4 INJECTION INTRAMUSCULAR; INTRAVENOUS EVERY 6 HOURS PRN
Status: DISCONTINUED | OUTPATIENT
Start: 2017-11-10 | End: 2017-11-13

## 2017-11-10 RX ORDER — HYDROCODONE BITARTRATE AND ACETAMINOPHEN 5; 325 MG/1; MG/1
2 TABLET ORAL AS NEEDED
Status: DISCONTINUED | OUTPATIENT
Start: 2017-11-10 | End: 2017-11-10 | Stop reason: HOSPADM

## 2017-11-10 RX ORDER — SODIUM CHLORIDE 9 MG/ML
INJECTION, SOLUTION INTRAVENOUS CONTINUOUS PRN
Status: DISCONTINUED | OUTPATIENT
Start: 2017-11-10 | End: 2017-11-10 | Stop reason: SURG

## 2017-11-10 RX ORDER — DEXAMETHASONE SODIUM PHOSPHATE 4 MG/ML
VIAL (ML) INJECTION AS NEEDED
Status: DISCONTINUED | OUTPATIENT
Start: 2017-11-10 | End: 2017-11-10 | Stop reason: SURG

## 2017-11-10 RX ORDER — METOCLOPRAMIDE 10 MG/1
10 TABLET ORAL ONCE
Status: DISCONTINUED | OUTPATIENT
Start: 2017-11-10 | End: 2017-11-10 | Stop reason: HOSPADM

## 2017-11-10 RX ORDER — CITALOPRAM 20 MG/1
20 TABLET ORAL DAILY
Status: DISCONTINUED | OUTPATIENT
Start: 2017-11-11 | End: 2017-11-13

## 2017-11-10 RX ORDER — PRAVASTATIN SODIUM 10 MG
10 TABLET ORAL NIGHTLY
Status: DISCONTINUED | OUTPATIENT
Start: 2017-11-10 | End: 2017-11-13

## 2017-11-10 RX ORDER — MORPHINE SULFATE 15 MG/1
15 TABLET ORAL EVERY 4 HOURS PRN
Status: DISCONTINUED | OUTPATIENT
Start: 2017-11-10 | End: 2017-11-13

## 2017-11-10 RX ORDER — HYDROMORPHONE HYDROCHLORIDE 1 MG/ML
0.4 INJECTION, SOLUTION INTRAMUSCULAR; INTRAVENOUS; SUBCUTANEOUS EVERY 2 HOUR PRN
Status: DISCONTINUED | OUTPATIENT
Start: 2017-11-10 | End: 2017-11-13

## 2017-11-10 RX ORDER — FAMOTIDINE 20 MG/1
20 TABLET ORAL ONCE
Status: DISCONTINUED | OUTPATIENT
Start: 2017-11-10 | End: 2017-11-10 | Stop reason: HOSPADM

## 2017-11-10 RX ORDER — NALOXONE HYDROCHLORIDE 0.4 MG/ML
0.08 INJECTION, SOLUTION INTRAMUSCULAR; INTRAVENOUS; SUBCUTANEOUS
Status: DISCONTINUED | OUTPATIENT
Start: 2017-11-10 | End: 2017-11-13

## 2017-11-10 RX ORDER — HYDROMORPHONE HYDROCHLORIDE 1 MG/ML
0.6 INJECTION, SOLUTION INTRAMUSCULAR; INTRAVENOUS; SUBCUTANEOUS EVERY 5 MIN PRN
Status: DISCONTINUED | OUTPATIENT
Start: 2017-11-10 | End: 2017-11-10 | Stop reason: HOSPADM

## 2017-11-10 RX ORDER — DOCUSATE SODIUM 100 MG/1
100 CAPSULE, LIQUID FILLED ORAL 2 TIMES DAILY
Status: DISCONTINUED | OUTPATIENT
Start: 2017-11-10 | End: 2017-11-13

## 2017-11-10 RX ORDER — ONDANSETRON 2 MG/ML
4 INJECTION INTRAMUSCULAR; INTRAVENOUS EVERY 4 HOURS PRN
Status: DISCONTINUED | OUTPATIENT
Start: 2017-11-10 | End: 2017-11-13

## 2017-11-10 RX ORDER — FAMOTIDINE 20 MG/1
TABLET ORAL
Status: COMPLETED
Start: 2017-11-10 | End: 2017-11-10

## 2017-11-10 RX ORDER — LIDOCAINE HYDROCHLORIDE 10 MG/ML
INJECTION, SOLUTION EPIDURAL; INFILTRATION; INTRACAUDAL; PERINEURAL AS NEEDED
Status: DISCONTINUED | OUTPATIENT
Start: 2017-11-10 | End: 2017-11-10 | Stop reason: SURG

## 2017-11-10 RX ORDER — FAMOTIDINE 20 MG/1
20 TABLET ORAL 2 TIMES DAILY
Status: DISCONTINUED | OUTPATIENT
Start: 2017-11-10 | End: 2017-11-13

## 2017-11-10 RX ORDER — HYDROMORPHONE HYDROCHLORIDE 1 MG/ML
0.2 INJECTION, SOLUTION INTRAMUSCULAR; INTRAVENOUS; SUBCUTANEOUS EVERY 2 HOUR PRN
Status: DISCONTINUED | OUTPATIENT
Start: 2017-11-10 | End: 2017-11-13

## 2017-11-10 RX ORDER — HYDROMORPHONE HYDROCHLORIDE 1 MG/ML
INJECTION, SOLUTION INTRAMUSCULAR; INTRAVENOUS; SUBCUTANEOUS AS NEEDED
Status: DISCONTINUED | OUTPATIENT
Start: 2017-11-10 | End: 2017-11-10 | Stop reason: SURG

## 2017-11-10 RX ORDER — DEXTROSE AND SODIUM CHLORIDE 5; .45 G/100ML; G/100ML
INJECTION, SOLUTION INTRAVENOUS CONTINUOUS
Status: DISCONTINUED | OUTPATIENT
Start: 2017-11-10 | End: 2017-11-13

## 2017-11-10 RX ADMIN — NEOSTIGMINE METHYLSULFATE 2 MG: 0.5 INJECTION INTRAVENOUS at 11:05:00

## 2017-11-10 RX ADMIN — ROCURONIUM BROMIDE 50 MG: 10 INJECTION, SOLUTION INTRAVENOUS at 07:38:00

## 2017-11-10 RX ADMIN — LABETALOL HYDROCHLORIDE 5 MG: 5 INJECTION, SOLUTION INTRAVENOUS at 08:50:00

## 2017-11-10 RX ADMIN — HYDROMORPHONE HYDROCHLORIDE 0.5 MG: 1 INJECTION, SOLUTION INTRAMUSCULAR; INTRAVENOUS; SUBCUTANEOUS at 10:07:00

## 2017-11-10 RX ADMIN — EPHEDRINE SULFATE 10 MG: 50 INJECTION, SOLUTION INTRAVENOUS at 11:00:00

## 2017-11-10 RX ADMIN — SODIUM CHLORIDE, SODIUM LACTATE, POTASSIUM CHLORIDE, CALCIUM CHLORIDE: 600; 310; 30; 20 INJECTION, SOLUTION INTRAVENOUS at 07:33:00

## 2017-11-10 RX ADMIN — EPHEDRINE SULFATE 10 MG: 50 INJECTION, SOLUTION INTRAVENOUS at 10:40:00

## 2017-11-10 RX ADMIN — LABETALOL HYDROCHLORIDE 5 MG: 5 INJECTION, SOLUTION INTRAVENOUS at 09:15:00

## 2017-11-10 RX ADMIN — SODIUM CHLORIDE: 9 INJECTION, SOLUTION INTRAVENOUS at 10:55:00

## 2017-11-10 RX ADMIN — GLYCOPYRROLATE 0.4 MG: 0.2 INJECTION INTRAMUSCULAR; INTRAVENOUS at 11:05:00

## 2017-11-10 RX ADMIN — LABETALOL HYDROCHLORIDE 5 MG: 5 INJECTION, SOLUTION INTRAVENOUS at 08:27:00

## 2017-11-10 RX ADMIN — SODIUM CHLORIDE: 9 INJECTION, SOLUTION INTRAVENOUS at 07:55:00

## 2017-11-10 RX ADMIN — DEXAMETHASONE SODIUM PHOSPHATE 8 MG: 4 MG/ML VIAL (ML) INJECTION at 07:38:00

## 2017-11-10 RX ADMIN — EPHEDRINE SULFATE 10 MG: 50 INJECTION, SOLUTION INTRAVENOUS at 09:31:00

## 2017-11-10 RX ADMIN — LIDOCAINE HYDROCHLORIDE 50 MG: 10 INJECTION, SOLUTION EPIDURAL; INFILTRATION; INTRACAUDAL; PERINEURAL at 07:38:00

## 2017-11-10 RX ADMIN — SODIUM CHLORIDE, SODIUM LACTATE, POTASSIUM CHLORIDE, CALCIUM CHLORIDE: 600; 310; 30; 20 INJECTION, SOLUTION INTRAVENOUS at 09:40:00

## 2017-11-10 RX ADMIN — SODIUM CHLORIDE, SODIUM LACTATE, POTASSIUM CHLORIDE, CALCIUM CHLORIDE: 600; 310; 30; 20 INJECTION, SOLUTION INTRAVENOUS at 12:07:00

## 2017-11-10 RX ADMIN — EPHEDRINE SULFATE 10 MG: 50 INJECTION, SOLUTION INTRAVENOUS at 09:27:00

## 2017-11-10 NOTE — ANESTHESIA PREPROCEDURE EVALUATION
Anesthesia PreOp Note    HPI:     Nury Flaherty is a 67year old female who presents for preoperative consultation requested by: Serena Medina MD    Date of Surgery: 11/10/2017    Procedure(s):  HIP TOTAL REPLACEMENT  Indication: left hip osteoarthri TUBAL LIGATION      Prescriptions Prior to Admission:  Levothyroxine Sodium 100 MCG Oral Tab Take 100 mcg by mouth before breakfast. Disp:  Rfl:  11/3/2017   Oxybutynin Chloride ER 15 MG Oral Tablet 24 Hr Take 1 tablet (15 mg total) by mouth daily.  Disp: 9 Oral Tab Take  by mouth. Disp:  Rfl:  Taking   Vitamin D3 (VITAMIN D3) 1000 UNITS Oral Tab Take 1 tablet by mouth daily.  (Patient taking differently: Take 2,000 Units by mouth daily.  ) Disp: 90 tablet Rfl: 4 11/3/2017       Current Facility-Administered M Yes  0.0 oz/week     Comment: socially     Drug use: No    Sexual activity: No     Other Topics Concern    Caffeine Concern Yes    Comment: Per NG:  coffee( 2 cups )daily     Social History Narrative   None on file       Available pre-op labs reviewed. anesthetic plan, benefits, risks, major complications, and any alternative forms of anesthetic management. All of the patient's questions were answered to the best of my ability. The patient desires the anesthetic management as planned.   Erin Alas

## 2017-11-10 NOTE — PROGRESS NOTES
[facility]    Progress Note    Marlys Meadows Patient Status:  Surgery Admit    1945 MRN [id number]   Location [address] UNIT Attending Radha Garvin MD   Hosp Day # 0 PCP MD Ashley Best PROPHYLAXIS, PT/OT. HOLD MTX AND TOFACINIB     Hyperlipidemia  CONT HOME MEDS, MONITOR. Hypertension  CONT HOME MEDS, MONITOR. Hypothyroid  CONT HOME MEDS.          Results:     Lab Results  Component Value Date   WBC 3.7 (L) 11/02/2017   HGB

## 2017-11-10 NOTE — BRIEF OP NOTE
[facility]   Brief Op Note    Patients Name: Ruben Morrisseyeamonmitch  Attending Physician: Be Gibbs MD  Operating Physician: Ellis Mcdonald MD  CSN: [id number]     Location:  OR  MRN: [id number]    YOB: 1945  Admission

## 2017-11-10 NOTE — H&P
Alee Maher MD   Family Practice      []Manual[]Template  []Copied  HPI:   China Palma is a 67year old female who presents for a complete physical exam for preoperative clearance for left hip replacement.      Pt here for preoperative clearanc LEVOTHYROXINE SODIUM 112 MCG Oral Tab TAKE ONE TABLET BY MOUTH ON MONDAY,WEDNESDAY AND FRIDAY.  CONTINUE TO TAKE LEVOTHYROXINE 100 MCG TABLET ON ALL OTHER DAYS Disp: 108 tablet Rfl: 0   ZOLPIDEM TARTRATE 10 MG Oral Tab TAKE ONE TABLET BY MOUTH ONCE DAILY AT Smoking status: Former Smoker                                                              Packs/day: 0.00      Years: 20.00        Types: Cigarettes     Quit date: 6/6/2008  Smokeless tobacco: Former User                     Comment: on&off for 25 years Stable. On simvastatin     4. Atherosclerosis of aorta (HCC)  On simvastatin.      5. Preoperative clearance  Pending labs and ekg to give clearance. Pt is at moderate risk. Pt to stop her Rheum meds, aspirin and any vitamins 1 week prior to surgery.

## 2017-11-10 NOTE — CM/SW NOTE
11/10-MD orders received in regards to discharge planning. The Patient and her  Favian Roche ([phone]) were seen at bedside. The Patient resides with her  in [address] [place] [address] in a single home  With 5 stairs up and  1 step to enter.    Prior to hospitali

## 2017-11-10 NOTE — CONSULTS
Progress Notes  Encounter Date: 10/17/2017 8:15 AM  Kaylin Kimbrough MD   SURGERY, ORTHOPEDIC      []Manual[]Template  []Copied  HPI:   Nilson Keith:  Bishop Beverly presents is a 57-year-old female with a history of rheumatoid arthritis and marked pain about X-RAYS: AP OF THE PELVIS; LEFT HIP AP AND LATERAL (October 10, 2017):  Arthritic change are noted bilaterally with joint space narrowing and subcapital osteophytes on the left greater than right femoral head generalized bone demineralization is seen without LABRUM:          There is a linear cleft of high signal under cutting the base of the anterior and superior labrum at their acetabular attachments consistent with nondisplaced degenerative tears.   EFFUSIONS:     None.  No synovitis or loose bodies.    BURS EXTREMITIES: denies numbness, tingling, or weakness  NEURO: denies headaches.         Current Outpatient Prescriptions:  Oxybutynin Chloride ER 5 MG Oral Tablet 24 Hr Take 1 tablet (5 mg total) by mouth daily.  Disp: 90 tablet Rfl: 1   Tofacitinib Citrate 1 [START ON 11/30/2017] HYDROcodone-acetaminophen  MG Oral Tab Take 1 tablet by mouth every 12 (twelve) hours as needed for Pain.  Disp: 60 tablet Rfl: 0         ALLERGIES:     Codeine                 Other (See Comments)    Comment:Abdominal pain     H · No focal tenderness about the hip.      · No abnormal posturing or obvious deformity.      · No decreased range of motion of the lumbosacral spine.      · No evidence for paraspinal muscle spasms nor listing.      · Good range of motion of the knees, ank The patient’s symptoms have been chronic, progressive, unresponsive to conservative treatment, and interfering with the ability to perform their normal activities of daily living.  An extensive discussion regarding benefits, risks, and potential complicatio thrombosis and/or pulmonary embolus and its potential sequelae – including possibly death – despite the use of prophylaxis; the increased risk of bleeding given the use of thromboembolic prophylaxis as enumerated above and with patients taking certain medi Digital transcription software was utilized to produce this note. The note was proofread for content only. Typographical errors may remain.       Codey Beauchamp MD         Electronically signed by Keyshawn Velasco MD at 10/30/2017  7:31 AM        [place]

## 2017-11-10 NOTE — PHYSICAL THERAPY NOTE
PHYSICAL THERAPY HIP EVALUATION - INPATIENT     Room Number: 410/410-A  Evaluation Date: 11/10/2017  Type of Evaluation: Initial  Physician Order: PT Eval and Treat    Presenting Problem: Rheumatoid arthritis s/p left total hip arthroplasty  Reason for The Problem List  Principal Problem:    Rheumatoid arthritis of hip (HCC)  Active Problems:    Hyperlipidemia    Hypertension    Hypothyroid    Arthritis of left hip      Past Medical History  Past Medical History:   Diagnosis Date   • Depression ?    • Regina Tolerated    PAIN ASSESSMENT  Ratin  Location: Left hip  Management Techniques: Activity promotion; Body mechanics;Repositioning    COGNITION  · Overall Cognitive Status:  WFL - within functional limits  · Following Commands:  follows all commands and d Foot flat  Stoop/Curb Assistance: Not tested       Bed Mobility: Mod assist     Transfers: Min assist     Exercise/Education Provided:  Bed mobility  Body mechanics  Energy conservation  Gait training  Posture  Strengthening  Lower therapeutic exercise:  A

## 2017-11-10 NOTE — PLAN OF CARE
PAIN - ADULT    • Verbalizes/displays adequate comfort level or patient's stated pain goal Progressing          SAFETY ADULT - FALL    • Free from fall injury Progressing          RISK FOR INFECTION - ADULT    • Absence of fever/infection during anticipate

## 2017-11-11 PROCEDURE — 99233 SBSQ HOSP IP/OBS HIGH 50: CPT | Performed by: HOSPITALIST

## 2017-11-11 RX ORDER — FUROSEMIDE 10 MG/ML
20 INJECTION INTRAMUSCULAR; INTRAVENOUS ONCE
Status: COMPLETED | OUTPATIENT
Start: 2017-11-11 | End: 2017-11-11

## 2017-11-11 NOTE — OPERATIVE REPORT
[facility]    PATIENT'S NAME: Bonita Gonzalez   ATTENDING PHYSICIAN: Cody Alexandra MD   OPERATING PHYSICIAN: Elizabeth Guan MD   PATIENT ACCOUNT#:   [id number]    LOCATION:  [address] [place] [address],[address] #:   [id number]       DATE OF SOTO osteophytes with some subchondral cyst seen in the superior acetabulum. She had undergone cortisone injection without any significant amelioration of pain.   Her symptoms have progressed to a point where they are profoundly impacting on her abilities to pe fact that her ipsilateral side is approximately 5 mm longer already and the fact that leg length discrepancies can be influenced also by ipsilateral contralateral arthritic changes in the knee, contralateral arthritic changes present in the hip, and of cou table in supine position. After adequate general endotracheal anesthesia was induced, a Ratliff catheter was inserted.   The patient was placed in the right lateral decubitus position on a well-padded Biomet table with an axillary/proximal chest roll appropr medial osteophyte was exposed initial reaming was performed with a 44 mm reamer down to the medial osteophyte, sequential increased size acetabular reaming by 1 mm was performed until bleeding bone was obtained at 48.   This was performed in the appropriate antibiotic irrigation system was utilized. An epinephrine-soaked sponge was placed in the femoral canal.  Three packets of methylmethacrylate bone cement (Palacos G) was vacuum mixed, placed in cement gun prior to the above.   A cement restrictor was place return was obtained. The short external rotators and capsule were reattached to its insertion using #1 PDS interrupted suture ligature. A large Hemovac drain was introduced and brought out distally and deep.   The deep fascia was closed with #1 PDS suture

## 2017-11-11 NOTE — HOME CARE LIAISON
DIAGNOSES AND PERTINENT MEDICAL HISTORY:[facility]    FACILITY NAME AND DC DATE: [facility]END[facility]    BEDSIDE VISIT WITH: PTNT    SERVICES ORDERED: RN PT    VERIFIED PATIENT ADDRESS, PHONE NUMBER AND CAREGIVER: YES    PCP IS DR. Oleg Prescott

## 2017-11-11 NOTE — OCCUPATIONAL THERAPY NOTE
OCCUPATIONAL THERAPY EVALUATION - INPATIENT      Room Number: 410/410-A  Evaluation Date: 11/11/2017  Type of Evaluation: Initial  Presenting Problem:  (s/p L OMAYRA-posterior approach)    Physician Order: IP Consult to Occupational Therapy  Reason for [place] OCCUPATIONAL THERAPY MEDICAL/SOCIAL HISTORY     Problem List   Principal Problem:    Rheumatoid arthritis of hip ([address]  Active Problems:    Hyperlipidemia    Hypertension    Hypothyroid    Arthritis of left hip      Past Medical History  Past Medical people like me. \" (re: pt's high level of anxiety and pain)    OCCUPATIONAL THERAPY EXAMINATION     OBJECTIVE  Precautions: OMAYRA - posterior;Hip abduction pillow  Fall Risk: High fall risk    WEIGHT BEARING RESTRICTION  Weight Bearing Restriction: L lower e Dynamic Standing: mod. A    FUNCTIONAL ADL ASSESSMENT  Grooming: set up  Feeding: n/t   Bathing: n/t   Toileting: n/t   Upper Body Dressing: n/t   Lower Body Dressing: max.  A       Education Provided: Role of OT, posterior hip precautions, LLE TTWB, safe

## 2017-11-11 NOTE — PROGRESS NOTES
[facility]    Progress Note    Amedeo Blood Patient Status:  Inpatient    1945 MRN [id number]   Location [facility] 4W/SW/SE Attending Arthur Boland MD   Hosp Day # 1 PCP Elroy Malagon MD       Subjective:   Bannerarnav Forbes Oxybutynin Chloride ER  15 mg Oral Daily   • Pravastatin Sodium  10 mg Oral Nightly   • docusate sodium  100 mg Oral BID   • apixaban  2.5 mg Oral BID   • famoTIDine  20 mg Oral BID    Or   • famoTIDine  20 mg Intravenous BID   • ferrous sulfate  325 mg Or 0.9 11/02/2017       Culture:  No results found for this visit on 11/10/17. Imaging/EKG:   Xr Hip (1 View) Unilateral Em    Result Date: 11/10/2017  CONCLUSION:  1.  Status post left total hip arthroplasty             Assessment and Plan:     Rheumatoid

## 2017-11-11 NOTE — PROGRESS NOTES
[facility]    Progress Note    Estil Leader Patient Status:  Inpatient    1945 MRN [id number]   Location [facility] 4W/SW/SE Attending Willam Claire MD   Hosp Day # 1 PCP Aly Brannon MD     Date of Admission:   Normal Saline Flush 0.9 % injection 10 mL 10 mL Intravenous PRN   sodium chloride 0.9% IV bolus 500 mL 500 mL Intravenous Once PRN   docusate sodium (COLACE) cap 100 mg 100 mg Oral BID   PEG 3350 (MIRALAX) powder packet 17 g 17 g Oral Daily PRN   magnesi HCl (ZOFRAN) injection 4 mg 4 mg Intravenous Q6H PRN   Naloxone HCl (NARCAN) 0.4 MG/ML injection 0.08 mg 0.08 mg Intravenous Q5 Min PRN   DiphenhydrAMINE HCl (BENADRYL) injection 12.5 mg 12.5 mg Intravenous Q4H PRN   Nalbuphine HCl (NUBAIN) injection 2.5 m on&off for 25 years  Alcohol use: Yes           0.0 oz/week     Comment: socially        PHYSICAL EXAM:   /48 (BP Location: Right arm)   Pulse 86   Temp 98.1 °F (36.7 °C) (Oral)   Resp 18   Ht 59\"   Wt 173 lb 3 oz   SpO2 97%   BMI 34.98 kg/m²   She care facility      Digital transcription software was utilized to produce this note. The note was proofread for content only. Typographical errors may remain.       Nayely Milian MD  11/11/2017

## 2017-11-12 PROCEDURE — 99232 SBSQ HOSP IP/OBS MODERATE 35: CPT | Performed by: HOSPITALIST

## 2017-11-12 PROCEDURE — 30233N1 TRANSFUSION OF NONAUTOLOGOUS RED BLOOD CELLS INTO PERIPHERAL VEIN, PERCUTANEOUS APPROACH: ICD-10-PCS | Performed by: ORTHOPAEDIC SURGERY

## 2017-11-12 RX ORDER — SODIUM CHLORIDE 9 MG/ML
INJECTION, SOLUTION INTRAVENOUS ONCE
Status: COMPLETED | OUTPATIENT
Start: 2017-11-12 | End: 2017-11-12

## 2017-11-12 RX ORDER — SODIUM CHLORIDE 0.9 % (FLUSH) 0.9 %
10 SYRINGE (ML) INJECTION AS NEEDED
Status: DISCONTINUED | OUTPATIENT
Start: 2017-11-12 | End: 2017-11-13

## 2017-11-12 RX ORDER — FUROSEMIDE 10 MG/ML
20 INJECTION INTRAMUSCULAR; INTRAVENOUS ONCE
Status: COMPLETED | OUTPATIENT
Start: 2017-11-12 | End: 2017-11-12

## 2017-11-12 RX ORDER — SODIUM CHLORIDE 9 MG/ML
INJECTION, SOLUTION INTRAVENOUS
Status: COMPLETED
Start: 2017-11-12 | End: 2017-11-12

## 2017-11-12 NOTE — PROGRESS NOTES
[facility]    Progress Note    Antonio Cherry Patient Status:  Inpatient    1945 MRN [id number]   Location [facility] 4W/SW/SE Attending Naomi Baugh MD   Hosp Day # 2 PCP Michaela Taylor MD     Date of Admission:  6 (DITROPAN-XL) 24 hr tab 15 mg 15 mg Oral Daily   Pravastatin Sodium (PRAVACHOL) tab 10 mg 10 mg Oral Nightly   Zolpidem Tartrate (AMBIEN) tab 5 mg 5 mg Oral Nightly PRN   [] sodium chloride 0.9% IV bolus 500 mL 500 mL Intravenous Once PRN   docusate (ANCEF/KEFZOL) IVPB premix 2 g 2 g Intravenous Q8H   Cyclobenzaprine HCl (FLEXERIL) tab 5 mg 5 mg Oral Q8H PRN   ondansetron HCl (ZOFRAN) injection 4 mg 4 mg Intravenous Q6H PRN   Naloxone HCl (NARCAN) 0.4 MG/ML injection 0.08 mg 0.08 mg Intravenous Q5 Min Years: 20.00        Types: Cigarettes     Quit date: 6/6/2008  Smokeless tobacco: Former User                     Comment: on&off for 25 years  Alcohol use: Yes           0.0 oz/week     Comment: socially        PHYSICAL EXAM:   /42 (BP Location: Rig red blood cells  Continue anticoagulation  PT/OT twice daily–will advance her to foot flat partial weightbearing  Her Hemovac drainage is decrease; Hemovac drain pulled  Ratliff catheter is been DC'd. Her PCA was stopped.    consultation with

## 2017-11-12 NOTE — PHYSICAL THERAPY NOTE
PHYSICAL THERAPY TREATMENT NOTE - INPATIENT    Room Number: 169/868-I       Presenting Problem: Rheumatoid arthritis s/p left total hip arthroplasty    Problem List  Principal Problem:    Rheumatoid arthritis of hip ([address]  Active Problems:    Hyperlipidem standing up from a chair with arms (e.g., wheelchair, bedside commode, etc.): A Lot   -   Moving from lying on back to sitting on the side of the bed?: A Lot   How much help from another person does the patient currently need. ..   -   Moving to and from a Status IN PROGRESS

## 2017-11-12 NOTE — PHYSICAL THERAPY NOTE
PHYSICAL THERAPY HIP TREATMENT NOTE - INPATIENT    Room Number: 539/484-P            Presenting Problem: Rheumatoid arthritis s/p left total hip arthroplasty    Problem List  Principal Problem:    Rheumatoid arthritis of hip ([address]  Active Problems:    Hype Restriction: L lower extremity           L Lower Extremity: Toe Touch Weight Bearing (with maximum weight bearing on LLE is foot flat)    PAIN ASSESSMENT   Rating:  (/)  Location: Left hip  Management Techniques: Activity promotion; Body mechanics;Repositio able to ambulate 300 feet with assistive device at assistance level: modified independent    Goal #3   Current Status amb 15 ft w/ RW & Min A w/ chair follow   Goal #4 Patient will negotiate 6 stairs/one curb w/ assistive device and supervision   Goal #4

## 2017-11-12 NOTE — PROGRESS NOTES
[facility]    Progress Note    Glo Coombs Patient Status:  Inpatient    1945 MRN [id number]   Location [facility] 4W/SW/SE Attending Sharlene Healy MD   [facility] Day # 2 PCP Helen Mar MD       Subjective:   [place] Pravastatin Sodium  10 mg Oral Nightly   • docusate sodium  100 mg Oral BID   • apixaban  2.5 mg Oral BID   • famoTIDine  20 mg Oral BID    Or   • famoTIDine  20 mg Intravenous BID   • ferrous sulfate  325 mg Oral Daily with breakfast   • [MAR Hold] virgen INR 0.9 11/02/2017       Culture:  No results found for this visit on 11/10/17. Imaging/EKG:   Xr Hip (1 View) Unilateral Em    Result Date: 11/10/2017  CONCLUSION:  1.  Status post left total hip arthroplasty             Assessment and Plan:     Rheum

## 2017-11-12 NOTE — PLAN OF CARE
Diabetes/Glucose Control    • Glucose maintained within prescribed range Completed            DISCHARGE PLANNING    • Discharge to home or other facility with appropriate resources Progressing        PAIN - ADULT    • Verbalizes/displays adequate comfort l

## 2017-11-13 VITALS
HEIGHT: 59 IN | BODY MASS INDEX: 34.92 KG/M2 | TEMPERATURE: 99 F | SYSTOLIC BLOOD PRESSURE: 119 MMHG | DIASTOLIC BLOOD PRESSURE: 46 MMHG | RESPIRATION RATE: 18 BRPM | OXYGEN SATURATION: 91 % | WEIGHT: 173.19 LBS | HEART RATE: 82 BPM

## 2017-11-13 PROCEDURE — 99239 HOSP IP/OBS DSCHRG MGMT >30: CPT | Performed by: HOSPITALIST

## 2017-11-13 RX ORDER — CYCLOBENZAPRINE HCL 5 MG
5 TABLET ORAL EVERY 8 HOURS PRN
Qty: 30 TABLET | Refills: 0 | Status: SHIPPED | OUTPATIENT
Start: 2017-11-13 | End: 2018-01-04

## 2017-11-13 RX ORDER — MELATONIN
325
Refills: 0 | Status: SHIPPED | COMMUNITY
Start: 2017-11-14 | End: 2017-12-06 | Stop reason: ALTCHOICE

## 2017-11-13 RX ORDER — MORPHINE SULFATE 15 MG/1
15 TABLET ORAL EVERY 4 HOURS PRN
Qty: 30 TABLET | Refills: 0 | Status: SHIPPED | OUTPATIENT
Start: 2017-11-13 | End: 2017-12-06

## 2017-11-13 RX ORDER — OXYCODONE HYDROCHLORIDE AND ACETAMINOPHEN 5; 325 MG/1; MG/1
1-2 TABLET ORAL EVERY 4 HOURS PRN
Qty: 30 TABLET | Refills: 0 | Status: SHIPPED | OUTPATIENT
Start: 2017-11-13 | End: 2018-04-05

## 2017-11-13 RX ORDER — PSEUDOEPHEDRINE HCL 30 MG
100 TABLET ORAL 2 TIMES DAILY
Refills: 0 | Status: SHIPPED | COMMUNITY
Start: 2017-11-13 | End: 2018-08-02

## 2017-11-13 NOTE — OCCUPATIONAL THERAPY NOTE
OCCUPATIONAL THERAPY TREATMENT NOTE - INPATIENT     Room Number: 027/981-B         Presenting Problem:  (s/p L OMAYRA-posterior approach)    Problem List  Principal Problem:    Rheumatoid arthritis of hip ([address]  Active Problems:    Hyperlipidemia    Hypertens a supervised setting  OT Device Recommendations: 3-in-1 commode;Hiwotb bars      PLAN  OT Treatment Plan: Balance activities; Energy conservation/work simplification techniques;ADL training;Functional transfer training; Endurance training;Patient/Family educat at sink for approximately 5 min to brush dentures while maintaining hip precautions.)      FUNCTIONAL ADL ASSESSMENT  Grooming: indep  Bathing: indep  Toileting: CGA  Upper Body Dressing: indep  Lower Body Dressing: min assist    Education Provided: Pt was

## 2017-11-13 NOTE — CM/SW NOTE
The pt. Is planning to go to rehab when medically stable. The pt's first choice for rehab is [address] [place] [address].  Referral made and DON screen has been requested from [facility]. The pt. Is ready for discharge today. Insurance Mahamed Richter is pending.       1215pm-

## 2017-11-13 NOTE — PROGRESS NOTES
[facility]    Progress Note    Kenya Chisholm Patient Status:  Inpatient    1945 MRN [id number]   Location [facility] 4W/SW/SE Attending Kourtney Person MD   Hosp Day # 3 PCP Jose Damon MD     Date of Admission:   (DITROPAN-XL) 24 hr tab 15 mg 15 mg Oral Daily   Pravastatin Sodium (PRAVACHOL) tab 10 mg 10 mg Oral Nightly   Zolpidem Tartrate (AMBIEN) tab 5 mg 5 mg Oral Nightly PRN   [] sodium chloride 0.9% IV bolus 500 mL 500 mL Intravenous Once PRN   docusate [COMPLETED] CeFAZolin Sodium (ANCEF/KEFZOL) IVPB premix 2 g 2 g Intravenous Q8H   Cyclobenzaprine HCl (FLEXERIL) tab 5 mg 5 mg Oral Q8H PRN   ondansetron HCl (ZOFRAN) injection 4 mg 4 mg Intravenous Q6H PRN   Naloxone HCl (NARCAN) 0.4 MG/ML injection 0.0 Packs/day: 0.00      Years: 20.00        Types: Cigarettes     Quit date: 6/6/2008  Smokeless tobacco: Former User                     Comment: on&off for 25 years  Alcohol use: Yes           0.0 oz/week     Comment: socially        PHYSICAL EXAM: patient prefers [facility] or [facility] with private room.  to arrange transfer to extended care facility today        Digital transcription software was utilized to produce this note. The note was proofread for content only.  Typographical

## 2017-11-13 NOTE — PLAN OF CARE
Pt vitals stable, alert x4, family at bedside. Up with 2 assist and walker, 30% weight baring, voiding regularly. Plan to d/c to [address] [facility] this afternoon. Bed in low locked position, call light in reach.

## 2017-11-13 NOTE — DISCHARGE SUMMARY
[facility]    Discharge Summary    Sourav Ordoñez Patient Status:  Inpatient    1945 MRN [id number]   Location [facility] 4W/SW/SE Attending Frieda High MD   Hosp Day # 3 PCP Bhupendra De La Rosa MD     Date of Admission these medications      Instructions Prescription details   apixaban 2.5 MG Tabs  Commonly known as:  ELIQUIS      Take 1 tablet (2.5 mg total) by mouth 2 (two) times daily.    Refills:  0     Cyclobenzaprine HCl 5 MG Tabs  Commonly known as:  FLEXERIL DAILY   Quantity:  90 tablet  Refills:  3     Levothyroxine Sodium 100 MCG Tabs  Commonly known as:  SYNTHROID      Take 100 mcg by mouth before breakfast.   Refills:  0     lisinopril 40 MG Tabs  Commonly known as:  PRINIVIL,ZESTRIL      TAKE ONE TABLET B

## 2017-11-14 ENCOUNTER — SNF VISIT (OUTPATIENT)
Dept: INTERNAL MEDICINE CLINIC | Facility: SKILLED NURSING FACILITY | Age: 72
End: 2017-11-14

## 2017-11-14 VITALS
OXYGEN SATURATION: 97 % | SYSTOLIC BLOOD PRESSURE: 165 MMHG | DIASTOLIC BLOOD PRESSURE: 69 MMHG | HEART RATE: 78 BPM | BODY MASS INDEX: 35 KG/M2 | RESPIRATION RATE: 20 BRPM | TEMPERATURE: 97 F | WEIGHT: 172 LBS

## 2017-11-14 DIAGNOSIS — M25.552 LEFT HIP PAIN: ICD-10-CM

## 2017-11-14 DIAGNOSIS — I10 ESSENTIAL HYPERTENSION: ICD-10-CM

## 2017-11-14 DIAGNOSIS — M16.12 ARTHRITIS OF LEFT HIP: Primary | ICD-10-CM

## 2017-11-14 DIAGNOSIS — M05.752 RHEUMATOID ARTHRITIS INVOLVING LEFT HIP WITH POSITIVE RHEUMATOID FACTOR (HCC): ICD-10-CM

## 2017-11-14 PROCEDURE — 99310 SBSQ NF CARE HIGH MDM 45: CPT | Performed by: NURSE PRACTITIONER

## 2017-11-17 ENCOUNTER — SNF VISIT (OUTPATIENT)
Dept: INTERNAL MEDICINE CLINIC | Facility: SKILLED NURSING FACILITY | Age: 72
End: 2017-11-17

## 2017-11-17 VITALS
WEIGHT: 172 LBS | TEMPERATURE: 98 F | OXYGEN SATURATION: 97 % | DIASTOLIC BLOOD PRESSURE: 66 MMHG | BODY MASS INDEX: 35 KG/M2 | SYSTOLIC BLOOD PRESSURE: 142 MMHG | RESPIRATION RATE: 20 BRPM | HEART RATE: 82 BPM

## 2017-11-17 DIAGNOSIS — I10 ESSENTIAL HYPERTENSION: ICD-10-CM

## 2017-11-17 DIAGNOSIS — M05.752 RHEUMATOID ARTHRITIS INVOLVING LEFT HIP WITH POSITIVE RHEUMATOID FACTOR (HCC): Primary | ICD-10-CM

## 2017-11-17 DIAGNOSIS — M25.552 LEFT HIP PAIN: ICD-10-CM

## 2017-11-17 PROCEDURE — 99308 SBSQ NF CARE LOW MDM 20: CPT | Performed by: NURSE PRACTITIONER

## 2017-11-17 NOTE — PROGRESS NOTES
Jose Ramon King YOB: 1945  Age 67year old  female patient is admitted to [place] [address] for 17 for rehab and strengthening     Admitted to [facility] AND [facility] on: 11/10/17 to 17    Chief complaint: Rheumatoid arthritis of hip ,S/PLeft total , doing better,moved to private room, much happier . VSS. Left hip incision with staples in place, dry dressing in place, no s/s of infection.  Left leg is no longer  Edematous, responded well to  lasix 20mg  x 3 days, with legs elevated with pillow and b ONCE DAILY Disp: 90 tablet Rfl: 0   ALENDRONATE SODIUM 70 MG Oral Tab TAKE ONE TABLET BY MOUTH ONCE A WEEK Disp: 12 tablet Rfl: 3   SIMVASTATIN 5 MG Oral Tab TAKE 1 TABLET BY MOUTH NIGHTLY.  CANCEL THE CRESTOR Disp: 90 tablet Rfl: 0   FOLIC ACID 1 MG Oral T HEALTH: well developed, well nourished, in no apparent distress  LINES, TUBES, DRAINS:  none  SKIN: no rashes, no suspicious lesions  WOUND: left hip with staples intact, no s/s of infection, drain sites dry and intact  EYES: PERRLA, EOMI, sclera anicteric D  -cbc and bmp 11/16/17, stable   3.   Hyperlipidemia  - cont simvastatin 5mg po q day   -cont to monitor     4.  Hypertension  - vs q shift  -cont Lisinopril 40mg po q day   -weekly wt  5.  Depression    - Celexa 20mg po daily   -psych and psychologist co

## 2017-11-21 ENCOUNTER — SNF VISIT (OUTPATIENT)
Dept: INTERNAL MEDICINE CLINIC | Facility: SKILLED NURSING FACILITY | Age: 72
End: 2017-11-21

## 2017-11-21 VITALS
WEIGHT: 180 LBS | DIASTOLIC BLOOD PRESSURE: 63 MMHG | SYSTOLIC BLOOD PRESSURE: 138 MMHG | TEMPERATURE: 97 F | OXYGEN SATURATION: 97 % | RESPIRATION RATE: 20 BRPM | HEART RATE: 91 BPM | BODY MASS INDEX: 36 KG/M2

## 2017-11-21 DIAGNOSIS — M25.552 LEFT HIP PAIN: Primary | ICD-10-CM

## 2017-11-21 DIAGNOSIS — M81.8 OTHER OSTEOPOROSIS WITHOUT CURRENT PATHOLOGICAL FRACTURE: ICD-10-CM

## 2017-11-21 DIAGNOSIS — M05.752 RHEUMATOID ARTHRITIS INVOLVING LEFT HIP WITH POSITIVE RHEUMATOID FACTOR (HCC): ICD-10-CM

## 2017-11-21 PROCEDURE — 99308 SBSQ NF CARE LOW MDM 20: CPT | Performed by: NURSE PRACTITIONER

## 2017-11-21 NOTE — PROGRESS NOTES
Cathy Leader  : 1945  Age 67year old  female patient is admitted to [place] [address] for 17 for rehab and strengthening     Admitted to [facility] AND [facility] on: 11/10/17 to 17    Chief complaint: Rheumatoid arthritis of hip ,S/PLeft total up , doing better,moved to private room, much happier . VSS.  Left hip incision with staples in place, dry dressing in place, no s/s of infection.  Left leg is no longer edematous, responded well to  lasix 20mg  x 3 days, with legs elevated with pillow and 90 tablet Rfl: 0   ALENDRONATE SODIUM 70 MG Oral Tab TAKE ONE TABLET BY MOUTH ONCE A WEEK Disp: 12 tablet Rfl: 3   SIMVASTATIN 5 MG Oral Tab TAKE 1 TABLET BY MOUTH NIGHTLY.  CANCEL THE CRESTOR Disp: 90 tablet Rfl: 0   FOLIC ACID 1 MG Oral Tab TAKE ONE TABLE developed, well nourished, in no apparent distress  LINES, TUBES, DRAINS:  none  SKIN: no rashes, no suspicious lesions  WOUND: left hip with staples intact, no s/s of infection, drain sites dry and intact  EYES: PERRLA, EOMI, sclera anicteric, conjunctiva other vitamins: Vit D3, MVI, Fish oil, folic acid, calcium with D  -cbc and bmp 11/16/17, stable   3.   Hyperlipidemia  - cont simvastatin 5mg po q day   -cont to monitor     4.  Hypertension  - vs q shift  -cont Lisinopril 40mg po q day   -weekly wt  5.  D

## 2017-11-24 ENCOUNTER — SNF VISIT (OUTPATIENT)
Dept: INTERNAL MEDICINE CLINIC | Facility: SKILLED NURSING FACILITY | Age: 72
End: 2017-11-24

## 2017-11-24 VITALS
BODY MASS INDEX: 36 KG/M2 | TEMPERATURE: 98 F | SYSTOLIC BLOOD PRESSURE: 118 MMHG | HEART RATE: 92 BPM | OXYGEN SATURATION: 97 % | RESPIRATION RATE: 20 BRPM | WEIGHT: 176.19 LBS | DIASTOLIC BLOOD PRESSURE: 57 MMHG

## 2017-11-24 DIAGNOSIS — M16.12 ARTHRITIS OF LEFT HIP: Primary | ICD-10-CM

## 2017-11-24 DIAGNOSIS — M81.8 OTHER OSTEOPOROSIS WITHOUT CURRENT PATHOLOGICAL FRACTURE: ICD-10-CM

## 2017-11-24 DIAGNOSIS — M05.752 RHEUMATOID ARTHRITIS INVOLVING LEFT HIP WITH POSITIVE RHEUMATOID FACTOR (HCC): ICD-10-CM

## 2017-11-24 PROCEDURE — 99308 SBSQ NF CARE LOW MDM 20: CPT | Performed by: NURSE PRACTITIONER

## 2017-11-24 NOTE — PROGRESS NOTES
Marlys Abdiel  : 1945  Age 67year old  female patient is admitted to [place] [address] for 17 for rehab and strengthening        Admitted to [facility] on: 11/10/17 to 17    Chief complaint: Rheumatoid arthritis of hip ,S/PLeft tot follow up , doing better.  VSS.  Left hip incision with staples in place, dry dressing in place, no s/s of infection. Left leg is no longer edematous, responded well to  lasix 20mg  x 3 days, with legs elevated with pillow and between legs.  [place] bui in juan LISINOPRIL 40 MG Oral Tab TAKE ONE TABLET BY MOUTH ONCE DAILY Disp: 30 tablet Rfl: 2   CITALOPRAM HYDROBROMIDE 20 MG Oral Tab TAKE ONE TABLET BY MOUTH ONCE DAILY Disp: 90 tablet Rfl: 0   ALENDRONATE SODIUM 70 MG Oral Tab TAKE ONE TABLET BY MOUTH ONCE A W of depression   HEMATOLOGY:hx of anemia  ENDOCRINE: hx of urinary retention   ALLERGY/IMM.: denies food or seasonal allergies      PHYSICAL EXAM:  GENERAL HEALTH: well developed, well nourished, in no apparent distress  LINES, TUBES, DRAINS:  none  SKIN: n these medications.   2.Anemia  -Multifactorial, m/l a combination of anemia of chronic dx given her RA and post-op status  -Received 1 U PRBC in hospital   -Trend Hgb  -Ferritin ok in hospital   -cont  oral iron daily   -cont other vitamins: Vit D3, MVI, Fi

## 2017-11-27 ENCOUNTER — SNF VISIT (OUTPATIENT)
Dept: INTERNAL MEDICINE CLINIC | Facility: SKILLED NURSING FACILITY | Age: 72
End: 2017-11-27

## 2017-11-27 VITALS
HEART RATE: 80 BPM | TEMPERATURE: 98 F | BODY MASS INDEX: 35 KG/M2 | OXYGEN SATURATION: 97 % | DIASTOLIC BLOOD PRESSURE: 83 MMHG | RESPIRATION RATE: 20 BRPM | SYSTOLIC BLOOD PRESSURE: 119 MMHG | WEIGHT: 173 LBS

## 2017-11-27 DIAGNOSIS — R53.1 GENERALIZED WEAKNESS: ICD-10-CM

## 2017-11-27 DIAGNOSIS — I10 ESSENTIAL HYPERTENSION: ICD-10-CM

## 2017-11-27 DIAGNOSIS — M25.552 LEFT HIP PAIN: Primary | ICD-10-CM

## 2017-11-27 PROCEDURE — 99308 SBSQ NF CARE LOW MDM 20: CPT | Performed by: NURSE PRACTITIONER

## 2017-11-27 NOTE — PROGRESS NOTES
Eun Carbajal  : 1945  Age 67year old  female patient is admitted to [place] [address] for 17 for rehab and strengthening     Admitted to [facility] AND [facility] on: 11/10/17 to 17    Chief complaint: Rheumatoid arthritis of hip ,S/PLeft total up , doing better. Reports going home 12/18/17 but she reports she may be able to go home sooner than that. Sees Dr. Ruiz Barewoo in am , staples to be removed .  VSS. No s/s of infection.  Left leg is no longer edematous, responded well to  lasix 20mg  x 3 da Tab TAKE ONE TABLET BY MOUTH ONCE DAILY Disp: 30 tablet Rfl: 2   CITALOPRAM HYDROBROMIDE 20 MG Oral Tab TAKE ONE TABLET BY MOUTH ONCE DAILY Disp: 90 tablet Rfl: 0   ALENDRONATE SODIUM 70 MG Oral Tab TAKE ONE TABLET BY MOUTH ONCE A WEEK Disp: 12 tablet Rfl: anemia  ENDOCRINE: hx of urinary retention   ALLERGY/IMM.: denies food or seasonal allergies      PHYSICAL EXAM:  GENERAL HEALTH: well developed, well nourished, in no apparent distress  LINES, TUBES, DRAINS:  none  SKIN: no rashes, no suspicious lesions rheumatology to determine when to resume these medications.   2.Anemia  -Multifactorial, m/l a combination of anemia of chronic dx given her RA and post-op status  -Received 1 U PRBC in hospital   -Trend Hgb  -Ferritin ok in hospital   -cont  oral iron tere

## 2017-11-28 ENCOUNTER — SNF VISIT (OUTPATIENT)
Dept: INTERNAL MEDICINE CLINIC | Facility: SKILLED NURSING FACILITY | Age: 72
End: 2017-11-28

## 2017-11-28 VITALS
TEMPERATURE: 97 F | OXYGEN SATURATION: 96 % | SYSTOLIC BLOOD PRESSURE: 118 MMHG | WEIGHT: 173 LBS | DIASTOLIC BLOOD PRESSURE: 68 MMHG | BODY MASS INDEX: 35 KG/M2 | RESPIRATION RATE: 20 BRPM | HEART RATE: 80 BPM

## 2017-11-28 DIAGNOSIS — M16.12 ARTHRITIS OF LEFT HIP: ICD-10-CM

## 2017-11-28 DIAGNOSIS — M25.552 LEFT HIP PAIN: ICD-10-CM

## 2017-11-28 PROBLEM — Z96.642 HISTORY OF TOTAL HIP ARTHROPLASTY, LEFT: Status: ACTIVE | Noted: 2017-11-28

## 2017-11-28 PROCEDURE — 99309 SBSQ NF CARE MODERATE MDM 30: CPT | Performed by: NURSE PRACTITIONER

## 2017-11-28 NOTE — PROGRESS NOTES
Venessamargarito Bartonrigoberto  : 1945  Age 67year old  female patient is admitted to [place] [address]  for 17 for rehab and strengthening     Admitted to [facility] on: 11/10/17 to 17    Chief complaint:Rheumatoid arthritis of hip ,S/PLeft total up , doing better.  Reports going home 12/18/17 but she reports she would like to  go home sooner than that, will discuss with therapy. Saw Dr. Pete De La Rosa today , staples removed and made 50% wt bearing .  VSS. No s/s of infection.  Left leg is still slightly MG Oral Tab Take 1 tablet (15 mg total) by mouth every 4 (four) hours as needed (severe pain).  Disp: 30 tablet Rfl: 0   Levothyroxine Sodium 100 MCG Oral Tab Take 100 mcg by mouth before breakfast. Disp:  Rfl:    Oxybutynin Chloride ER 15 MG Oral Tablet 24 denies nasal congestion, sinus pain or sore throat;  RESPIRATORY: denies shortness of breath, wheezing or cough   CARDIOVASCULAR:denies chest pain, no palpitations   GI: no stool x 2 days  :urinary urgency /frequency is her baseline, wearing depends  MUS flexeril 5 mg q 8 prn due to hard stools even with stool softer and miralax  - NEURO VASCULAR CHECKS q shift   - ELIQUIS FOR DVT PROPHYLAXIS  -dressing change q day, staples in place, no s/s of infection   -cont 20mg po x 5 days,  Slight edema noted, teresitaa

## 2017-11-30 ENCOUNTER — SNF VISIT (OUTPATIENT)
Dept: INTERNAL MEDICINE CLINIC | Facility: SKILLED NURSING FACILITY | Age: 72
End: 2017-11-30

## 2017-11-30 VITALS
OXYGEN SATURATION: 97 % | TEMPERATURE: 97 F | BODY MASS INDEX: 35 KG/M2 | WEIGHT: 173 LBS | RESPIRATION RATE: 20 BRPM | HEART RATE: 85 BPM | DIASTOLIC BLOOD PRESSURE: 63 MMHG | SYSTOLIC BLOOD PRESSURE: 104 MMHG

## 2017-11-30 DIAGNOSIS — M16.12 ARTHRITIS OF LEFT HIP: ICD-10-CM

## 2017-11-30 DIAGNOSIS — M25.552 LEFT HIP PAIN: ICD-10-CM

## 2017-11-30 DIAGNOSIS — M81.8 OTHER OSTEOPOROSIS WITHOUT CURRENT PATHOLOGICAL FRACTURE: ICD-10-CM

## 2017-11-30 DIAGNOSIS — M05.752 RHEUMATOID ARTHRITIS INVOLVING LEFT HIP WITH POSITIVE RHEUMATOID FACTOR (HCC): Primary | ICD-10-CM

## 2017-11-30 PROCEDURE — 99308 SBSQ NF CARE LOW MDM 20: CPT | Performed by: NURSE PRACTITIONER

## 2017-11-30 NOTE — PROGRESS NOTES
Cathy Leader  : 1945  Age 67year old  female patient is admitted to [place] [address] for 17 for rehab and strengthening        Admitted to [facility] on: 11/10/17 to 17    Chief complaint: Rheumatoid arthritis of hip ,S/PLeft tot follow up , doing better and excited to go home on Saturday 12/2/17. Saw Dr. Joe Caraballo 11/28/17 , staples removed and made 50% wt bearing .  VSS. No s/s of infection.  Left leg is still slightly edematous, responded well to  lasix 20mg  -will cont for 5 day (15 mg total) by mouth every 4 (four) hours as needed (severe pain).  Disp: 30 tablet Rfl: 0   Levothyroxine Sodium 100 MCG Oral Tab Take 100 mcg by mouth before breakfast. Disp:  Rfl:    Oxybutynin Chloride ER 15 MG Oral Tablet 24 Hr Take 1 tablet (15 mg t throat;  RESPIRATORY: denies shortness of breath, wheezing or cough   CARDIOVASCULAR:denies chest pain, no palpitations   GI: no stool x 2 days  :urinary urgency /frequency is her baseline, wearing depends  MUSCULOSKELETAL: s/p Left Hip arthroplasty   NE mg/325mg ppo q 4 prn, flexeril 5 mg q 8 prn due to hard stools even with stool softer and miralax  - NEURO VASCULAR CHECKS q shift   - ELIQUIS FOR DVT PROPHYLAXIS  - incision NYASIA, STAPLES OUT , no s/s of infection   -cont 20mg po x 5 days,  Slight edema no

## 2017-12-01 ENCOUNTER — SNF VISIT (OUTPATIENT)
Dept: INTERNAL MEDICINE CLINIC | Facility: SKILLED NURSING FACILITY | Age: 72
End: 2017-12-01

## 2017-12-01 VITALS
SYSTOLIC BLOOD PRESSURE: 115 MMHG | RESPIRATION RATE: 20 BRPM | OXYGEN SATURATION: 97 % | DIASTOLIC BLOOD PRESSURE: 50 MMHG | WEIGHT: 173 LBS | HEART RATE: 85 BPM | TEMPERATURE: 97 F | BODY MASS INDEX: 35 KG/M2

## 2017-12-01 DIAGNOSIS — M05.752 RHEUMATOID ARTHRITIS INVOLVING LEFT HIP WITH POSITIVE RHEUMATOID FACTOR (HCC): Primary | ICD-10-CM

## 2017-12-01 DIAGNOSIS — M25.552 LEFT HIP PAIN: ICD-10-CM

## 2017-12-01 DIAGNOSIS — M16.12 ARTHRITIS OF LEFT HIP: ICD-10-CM

## 2017-12-01 DIAGNOSIS — I10 ESSENTIAL HYPERTENSION: ICD-10-CM

## 2017-12-01 PROCEDURE — 99310 SBSQ NF CARE HIGH MDM 45: CPT | Performed by: NURSE PRACTITIONER

## 2017-12-01 NOTE — PROGRESS NOTES
Mk Hernandez, 6/24/1945, 67year old, female is being discharged from [place] [address] 12/2/17      DISCHARGE SUMMARY    Date of Admission [facility]:11/10/17 to 11/13/17    Date of Discharge [facility]:  11/13/17 to 12/2/17                               Admitt was then transferred to [facility] for rehab.    Patient was seen in follow up , doing better, home in am and excited to go home  Saturday 12/2/17. Saw Dr. Swanson Settler 11/28/17 , staples removed and made 50% wt bearing .  VSS. No s/s of infection.  Left le left lower leg   EYES: PERRLA, EOMI, sclera anicteric, conjunctiva normal; there is no nystagmus, no drainage from eyes  HENT: normocephalic; normal nose, no nasal drainage, mucous membranes pink, moist, pharynx no exudate, no visible cerumen.   NECK: suppl daily   -cont other vitamins: Vit D3, MVI, Fish oil, folic acid, calcium with D  -cbc and bmp 12/1/17 BEFORE DISCHARGE   3.   Hyperlipidemia  - cont simvastatin 5mg po q day   -cont to monitor     4.  Hypertension-controlled  - vs q shift  -cont Lisinopril

## 2017-12-06 ENCOUNTER — TELEPHONE (OUTPATIENT)
Dept: RHEUMATOLOGY | Facility: CLINIC | Age: 72
End: 2017-12-06

## 2017-12-06 ENCOUNTER — OFFICE VISIT (OUTPATIENT)
Dept: FAMILY MEDICINE CLINIC | Facility: CLINIC | Age: 72
End: 2017-12-06

## 2017-12-06 ENCOUNTER — OFFICE VISIT (OUTPATIENT)
Dept: PHYSICAL THERAPY | Age: 72
End: 2017-12-06
Attending: ORTHOPAEDIC SURGERY
Payer: MEDICARE

## 2017-12-06 VITALS
SYSTOLIC BLOOD PRESSURE: 128 MMHG | DIASTOLIC BLOOD PRESSURE: 84 MMHG | WEIGHT: 168 LBS | HEART RATE: 84 BPM | BODY MASS INDEX: 34 KG/M2

## 2017-12-06 DIAGNOSIS — E03.9 HYPOTHYROIDISM, UNSPECIFIED TYPE: ICD-10-CM

## 2017-12-06 DIAGNOSIS — Z96.642 AFTERCARE FOLLOWING LEFT HIP JOINT REPLACEMENT SURGERY: ICD-10-CM

## 2017-12-06 DIAGNOSIS — I10 ESSENTIAL HYPERTENSION: ICD-10-CM

## 2017-12-06 DIAGNOSIS — Z96.642 HISTORY OF LEFT HIP REPLACEMENT: Primary | ICD-10-CM

## 2017-12-06 DIAGNOSIS — Z47.1 AFTERCARE FOLLOWING LEFT HIP JOINT REPLACEMENT SURGERY: ICD-10-CM

## 2017-12-06 PROCEDURE — 99213 OFFICE O/P EST LOW 20 MIN: CPT | Performed by: FAMILY MEDICINE

## 2017-12-06 PROCEDURE — G0463 HOSPITAL OUTPT CLINIC VISIT: HCPCS | Performed by: FAMILY MEDICINE

## 2017-12-06 PROCEDURE — 97162 PT EVAL MOD COMPLEX 30 MIN: CPT

## 2017-12-06 NOTE — PROGRESS NOTES
Devin Pillai is a 67year old female. Patient presents with:  Post-Op    HPI:   S/p left hip replacement. Was in [facility] for rehab for 3 weeks. Still on blood thinners - Eliquis. Had been taking lasix while in [place]. Increased incontinence.  Be daily.  ) Disp: 90 tablet Rfl: 4   Omega-3 Fatty Acids (FISH OIL OR) Take  by mouth. Take 1 cap every day Disp:  Rfl:    famotidine (PEPCID) 10 MG Oral Tab Take by mouth daily. Disp:  Rfl:    Multiple Vitamin (MULTI-VITAMINS) Oral Tab Take  by mouth.  Dis edema  Musculoskeletal: left hip incision c/d/i - no drainage. steristrips still in place on half of it. 1. History of left hip replacement  Doing well overall. Ok to stop the lasix since moving more. 2. Hypothyroidism, unspecified type      3.

## 2017-12-06 NOTE — TELEPHONE ENCOUNTER
Talked to pt. She is getting better post surgery   She's having all there RA pains return. She's off mtx and xeljanz for 1 month.    The sx 11/10 - everything is healing well -   She was in [place] rehab -   [facility] can only put 50 % weight - and she is doi

## 2017-12-06 NOTE — TELEPHONE ENCOUNTER
Per pt, she needs to come and see Dr Christiano Koroma tomorrow if possible for Post-op,  due to pt need to go back to her meds. Pls advise.

## 2017-12-06 NOTE — TELEPHONE ENCOUNTER
Spoke with pt. She is asking if she can restart methotrexate and Nola Lopez. Reports she is hurting all over. Please advise.

## 2017-12-06 NOTE — TELEPHONE ENCOUNTER
Lyly  Provider can see pt on Friday 12/8 at 4:30 pm [facility], please schedule if pt is agreeable. Provider does not have any openings for tomorrow.

## 2017-12-06 NOTE — TELEPHONE ENCOUNTER
Can she come in at 4:30 pm at [facility] on Friday?   Can't do tomorrow - everything is booked - even a 1pm.

## 2017-12-06 NOTE — TELEPHONE ENCOUNTER
Pt states that she cannot come in on Friday. Pt can be reached at [phone]. Per pt she needs to speak with doctor or RN about her meds and would like a call back today.

## 2017-12-06 NOTE — PROGRESS NOTES
P.T. EVALUATION:   Referring Physician: Dr. Jorge Carroll  Diagnosis: Left Total Hip Arthroplasty (Posterior approach)      Date of Onset: November 10th, 2017 Date of Service: 12/6/2017     PATIENT SUMMARY   Marlys Meadows is a 67year old y/o female who prese currently using RW; no LOB with ambulation with RW    Gait: ambulates with decreased weight bearing through LLE    Today’s Treatment and Response:  Patient education provided on PT eval findings, treatment plan, goals, HEP  Patient received there ex, HEP

## 2017-12-12 ENCOUNTER — OFFICE VISIT (OUTPATIENT)
Dept: PHYSICAL THERAPY | Age: 72
End: 2017-12-12
Attending: ORTHOPAEDIC SURGERY
Payer: MEDICARE

## 2017-12-12 PROCEDURE — 97110 THERAPEUTIC EXERCISES: CPT

## 2017-12-12 NOTE — PROGRESS NOTES
Diagnosis: Left Total Hip Arthroplasty (Posterior approach)        Authorized # of Visits:  2/10 (medicare)         Next MD visit: Dec 19, 2017  Fall Risk: standard         Precautions: 50% weightbearing on surgical LLE       Medication Changes since last

## 2017-12-14 ENCOUNTER — OFFICE VISIT (OUTPATIENT)
Dept: PHYSICAL THERAPY | Age: 72
End: 2017-12-14
Attending: ORTHOPAEDIC SURGERY
Payer: MEDICARE

## 2017-12-14 PROCEDURE — 97110 THERAPEUTIC EXERCISES: CPT

## 2017-12-14 RX ORDER — SIMVASTATIN 5 MG
TABLET ORAL
Qty: 90 TABLET | Refills: 0 | Status: SHIPPED | OUTPATIENT
Start: 2017-12-14 | End: 2018-03-13

## 2017-12-14 NOTE — TELEPHONE ENCOUNTER
Cholesterol Medications  Protocol Criteria:  · Appointment scheduled in the past 12 months or in the next 3 months  · ALT & LDL on file in the past 12 months  · ALT result < 80  · LDL result <130   Recent Outpatient Visits            2 days ago     Arslan Montez Horne in [address]     In 1 month Aidee Davis, SONALI Reagan in [address]           Lab Results  Component Value Date    (H) 04/20/2017       Lab Results  Component Value Date   ALT 14 11/28/2017     Refilled per prot

## 2017-12-14 NOTE — PROGRESS NOTES
Diagnosis: Left Total Hip Arthroplasty (Posterior approach)        Authorized # of Visits:  3/10 (medicare)         Next MD visit: Dec 19, 2017  Fall Risk: standard         Precautions: 50% weightbearing on surgical LLE       Medication Changes since last

## 2017-12-18 ENCOUNTER — MED REC SCAN ONLY (OUTPATIENT)
Dept: RHEUMATOLOGY | Facility: CLINIC | Age: 72
End: 2017-12-18

## 2017-12-18 ENCOUNTER — OFFICE VISIT (OUTPATIENT)
Dept: PHYSICAL THERAPY | Age: 72
End: 2017-12-18
Attending: ORTHOPAEDIC SURGERY
Payer: MEDICARE

## 2017-12-18 PROCEDURE — 97110 THERAPEUTIC EXERCISES: CPT

## 2017-12-18 NOTE — PROGRESS NOTES
Diagnosis: Left Total Hip Arthroplasty (Posterior approach)        Authorized # of Visits:  3/10 (medicare)         Next MD visit: Dec 19, 2017  Fall Risk: standard         Precautions: 50% weightbearing on surgical LLE, posterior precautions        [facility]

## 2017-12-19 RX ORDER — LEVOTHYROXINE SODIUM 0.1 MG/1
TABLET ORAL
Qty: 90 TABLET | Refills: 0 | Status: SHIPPED | OUTPATIENT
Start: 2017-12-19 | End: 2018-04-05

## 2017-12-19 NOTE — TELEPHONE ENCOUNTER
Hypothyroid Medications: Refilled per protocol    Protocol Criteria:  Appointment scheduled in the past 12 months or the next 3 months  TSH resulted in the past 12 months that is normal  Recent Outpatient Visits             Ara Gordon Celestino           Lab Results  Component Value Date   TSH 0.96 10/12/2017   THYROIDFUNC 0.05 (L) 12/16/2014

## 2017-12-21 ENCOUNTER — OFFICE VISIT (OUTPATIENT)
Dept: PHYSICAL THERAPY | Age: 72
End: 2017-12-21
Attending: ORTHOPAEDIC SURGERY
Payer: MEDICARE

## 2017-12-21 PROCEDURE — 97110 THERAPEUTIC EXERCISES: CPT

## 2017-12-21 NOTE — PROGRESS NOTES
Diagnosis: Left Total Hip Arthroplasty (Posterior approach)        Authorized # of Visits:  4/10 (medicare)         Next MD visit: January 30, 2018  Fall Risk: standard         Precautions: 100% weightbearing on surgical LLE, posterior precautions        M

## 2017-12-26 ENCOUNTER — TELEPHONE (OUTPATIENT)
Dept: PHYSICAL THERAPY | Age: 72
End: 2017-12-26

## 2017-12-27 ENCOUNTER — OFFICE VISIT (OUTPATIENT)
Dept: PHYSICAL THERAPY | Age: 72
End: 2017-12-27
Attending: ORTHOPAEDIC SURGERY
Payer: MEDICARE

## 2017-12-27 PROCEDURE — 97110 THERAPEUTIC EXERCISES: CPT

## 2017-12-27 NOTE — PROGRESS NOTES
Diagnosis: Left Total Hip Arthroplasty (Posterior approach)        Authorized # of Visits:  5/10 (medicare)         Next MD visit: January 30, 2018  Fall Risk: standard         Precautions: 100% weightbearing on surgical LLE, posterior precautions        M

## 2017-12-28 ENCOUNTER — OFFICE VISIT (OUTPATIENT)
Dept: PHYSICAL THERAPY | Age: 72
End: 2017-12-28
Attending: ORTHOPAEDIC SURGERY
Payer: MEDICARE

## 2017-12-28 PROCEDURE — 97110 THERAPEUTIC EXERCISES: CPT

## 2018-01-02 ENCOUNTER — APPOINTMENT (OUTPATIENT)
Dept: PHYSICAL THERAPY | Age: 73
End: 2018-01-02
Attending: ORTHOPAEDIC SURGERY
Payer: MEDICARE

## 2018-01-02 ENCOUNTER — OFFICE VISIT (OUTPATIENT)
Dept: PHYSICAL THERAPY | Age: 73
End: 2018-01-02
Attending: ORTHOPAEDIC SURGERY
Payer: MEDICARE

## 2018-01-02 PROCEDURE — 97110 THERAPEUTIC EXERCISES: CPT

## 2018-01-02 NOTE — PROGRESS NOTES
Diagnosis: Left Total Hip Arthroplasty (Posterior approach)        Authorized # of Visits:  1/10 (6 total) (medicare)         Next MD visit: January 30, 2018  Fall Risk: standard         Precautions: 100% weightbearing on surgical LLE, posterior precaution

## 2018-01-03 ENCOUNTER — OFFICE VISIT (OUTPATIENT)
Dept: PHYSICAL THERAPY | Age: 73
End: 2018-01-03
Attending: ORTHOPAEDIC SURGERY
Payer: MEDICARE

## 2018-01-03 ENCOUNTER — APPOINTMENT (OUTPATIENT)
Dept: PHYSICAL THERAPY | Age: 73
End: 2018-01-03
Attending: ORTHOPAEDIC SURGERY
Payer: MEDICARE

## 2018-01-03 PROCEDURE — 97110 THERAPEUTIC EXERCISES: CPT

## 2018-01-03 NOTE — PROGRESS NOTES
Diagnosis: Left Total Hip Arthroplasty (Posterior approach)        Authorized # of Visits:  2/10 (7 total) MEDICARE         Next MD visit: January 30, 2018  Fall Risk: standard         Precautions: 100% weightbearing on surgical LLE, posterior precautions

## 2018-01-04 ENCOUNTER — APPOINTMENT (OUTPATIENT)
Dept: PHYSICAL THERAPY | Age: 73
End: 2018-01-04
Attending: ORTHOPAEDIC SURGERY
Payer: MEDICARE

## 2018-01-04 ENCOUNTER — OFFICE VISIT (OUTPATIENT)
Dept: PHYSICAL THERAPY | Age: 73
End: 2018-01-04
Attending: ORTHOPAEDIC SURGERY
Payer: MEDICARE

## 2018-01-04 ENCOUNTER — OFFICE VISIT (OUTPATIENT)
Dept: RHEUMATOLOGY | Facility: CLINIC | Age: 73
End: 2018-01-04

## 2018-01-04 VITALS
SYSTOLIC BLOOD PRESSURE: 180 MMHG | HEART RATE: 75 BPM | BODY MASS INDEX: 34.02 KG/M2 | TEMPERATURE: 98 F | WEIGHT: 171 LBS | DIASTOLIC BLOOD PRESSURE: 82 MMHG | HEIGHT: 59.5 IN

## 2018-01-04 DIAGNOSIS — Z51.81 THERAPEUTIC DRUG MONITORING: ICD-10-CM

## 2018-01-04 DIAGNOSIS — M06.9 RHEUMATOID ARTHRITIS INVOLVING MULTIPLE SITES, UNSPECIFIED RHEUMATOID FACTOR PRESENCE: Primary | ICD-10-CM

## 2018-01-04 PROCEDURE — 99214 OFFICE O/P EST MOD 30 MIN: CPT | Performed by: INTERNAL MEDICINE

## 2018-01-04 PROCEDURE — 97110 THERAPEUTIC EXERCISES: CPT

## 2018-01-04 PROCEDURE — G0463 HOSPITAL OUTPT CLINIC VISIT: HCPCS | Performed by: INTERNAL MEDICINE

## 2018-01-04 RX ORDER — HYDROCODONE BITARTRATE AND ACETAMINOPHEN 10; 325 MG/1; MG/1
1 TABLET ORAL EVERY 12 HOURS PRN
Qty: 60 TABLET | Refills: 0 | Status: SHIPPED | OUTPATIENT
Start: 2018-01-13 | End: 2018-02-13

## 2018-01-04 RX ORDER — HYDROCODONE BITARTRATE AND ACETAMINOPHEN 10; 325 MG/1; MG/1
TABLET ORAL AS NEEDED
Refills: 0 | COMMUNITY
Start: 2017-12-13 | End: 2018-01-04

## 2018-01-04 NOTE — PATIENT INSTRUCTIONS
1. Cont  Methotrexate 8 tablets a week and xeljanz 11mg ad ay   2. . Check labs today or in 1 month. 4. norco 10/325mg as needed. #60 x 1 given   5. Return to clinic in 2-3 months. 6. Cont.  Calcium 1500mg a day and vit d 800units   7. alendroante (fosam

## 2018-01-04 NOTE — PROGRESS NOTES
Shabbir Garcia is a 67year old female. HPI:   Patient presents with:  Rheumatoid Arthritis: left hip  Joint Pain      I had the pleasure of seeing your patient Mrs. Jakob Key  On 1/4/2018. ihad last seen hero n 10/23/2017.  I had last seen her on 9/ difference increase the ssz from 1000mg to 1500mg daily. She thought about the prolia - but she would like to try the fosamax again. She thinks that if she is dehyrdated she was causing the cramps. This year was the same thing.  She thinks it might not be take ssz 1500mg bid but she felt bloated and had heartburn. She had consitpation. So she went down to ssz 1000mg bid. Then weaned off and feels better with her stomach. She's gained 30 pounds. lyrica also caused constipation. She's off lyrica.    She's rehab she was given percocet and it was stronger than norco. She had bad constiptiaon. She has 2/10 pain. She restarted norco - now really only taking it once a day - not the twice a day she was doing.  So she is taking it easy  - and not walking out mu triamcinolone acetonide 0.1 % External Ointment Apply 1 Application topically 2 (two) times daily.  Disp: 1 Tube Rfl: 10   ZOLPIDEM TARTRATE 10 MG Oral Tab TAKE ONE TABLET BY MOUTH ONCE DAILY AT BEDTIME AS NEEDED Disp: 90 tablet Rfl: 1   Calcium 600 MG Or -   Not   Tenderness in mtsp with no swellingmild tender  Not Tender points  except b/l elbows - Very mild - 18/18 points  No edema       Component      Latest Ref Rng & Units 11/28/2017   Glucose      70 - 99 mg/dL 155 (H)   Sodium      136 - 144 mmol/L 1 FEMORAL NECK       BMD:  0.474 gm/sq. cm.    T SCORE: -3.4      Change since previous:  14% decrease     PA LUMBAR SPINE (L1 - L4)       BMD:  0.899 gm/sq.  cm.    T SCORE: -1.3       Change since previous:  0.7% decrease     5/18/2017 - b/l hip xxray   CON lights - stopped this   insomina -   3. Osteoposrosis - cont. fosamax,  - she stopped it b/c of the cramping on it. -   DEXA -  1/30/2014 - left femoral neck -2.7, left total hip is -2.1, lumbar spin eis -/1,3 = c/w ostoporosis.    - she is declining prolia

## 2018-01-08 ENCOUNTER — APPOINTMENT (OUTPATIENT)
Dept: PHYSICAL THERAPY | Age: 73
End: 2018-01-08
Attending: ORTHOPAEDIC SURGERY
Payer: MEDICARE

## 2018-01-08 ENCOUNTER — OFFICE VISIT (OUTPATIENT)
Dept: PHYSICAL THERAPY | Age: 73
End: 2018-01-08
Attending: ORTHOPAEDIC SURGERY
Payer: MEDICARE

## 2018-01-08 PROCEDURE — 97110 THERAPEUTIC EXERCISES: CPT

## 2018-01-08 NOTE — PROGRESS NOTES
Diagnosis: Left Total Hip Arthroplasty (Posterior approach)        Authorized # of Visits:  3/10 (8 total) MEDICARE         Next MD visit: January 30, 2018  Fall Risk: standard         Precautions: 100% weightbearing on surgical LLE, posterior precautions

## 2018-01-09 ENCOUNTER — OFFICE VISIT (OUTPATIENT)
Dept: PHYSICAL THERAPY | Age: 73
End: 2018-01-09
Attending: ORTHOPAEDIC SURGERY
Payer: MEDICARE

## 2018-01-09 ENCOUNTER — APPOINTMENT (OUTPATIENT)
Dept: PHYSICAL THERAPY | Age: 73
End: 2018-01-09
Attending: ORTHOPAEDIC SURGERY
Payer: MEDICARE

## 2018-01-09 PROCEDURE — 97110 THERAPEUTIC EXERCISES: CPT

## 2018-01-09 NOTE — PROGRESS NOTES
Diagnosis: Left Total Hip Arthroplasty (Posterior approach)        Authorized # of Visits:  4/10 (9 total) MEDICARE         Next MD visit: January 30, 2018  Fall Risk: standard         Precautions: 100% weightbearing on surgical LLE, posterior precautions

## 2018-01-10 ENCOUNTER — TELEPHONE (OUTPATIENT)
Dept: FAMILY MEDICINE CLINIC | Facility: CLINIC | Age: 73
End: 2018-01-10

## 2018-01-10 NOTE — TELEPHONE ENCOUNTER
Patient is due for Medicare Advantage Annual Wellness Visit, if patient returns call please assist in scheduling. Left message to call back [phone].

## 2018-01-11 ENCOUNTER — OFFICE VISIT (OUTPATIENT)
Dept: PHYSICAL THERAPY | Age: 73
End: 2018-01-11
Attending: ORTHOPAEDIC SURGERY
Payer: MEDICARE

## 2018-01-11 ENCOUNTER — APPOINTMENT (OUTPATIENT)
Dept: PHYSICAL THERAPY | Age: 73
End: 2018-01-11
Attending: ORTHOPAEDIC SURGERY
Payer: MEDICARE

## 2018-01-11 PROCEDURE — 97110 THERAPEUTIC EXERCISES: CPT

## 2018-01-11 NOTE — PROGRESS NOTES
Physical Therapy Progress Report    Pt has attended 10 visits in Physical Therapy.      Diagnosis: Left Total Hip Arthroplasty (Posterior approach)        Authorized # of Visits:  5/10 (10 total) MEDICARE         Next MD visit: January 30, 2018  Fall Risk: 4)    Plan: cont PT.      Charges: ex 3    Total Timed Treatment: 45  min  Total Treatment Time: 45 min    Goals:    1- Pt will be I with maintenance and progression of HEP - IN PROGRESS  2- Pt will demo increase in LLE strength to 5/5 in order to assist wi

## 2018-01-12 NOTE — TELEPHONE ENCOUNTER
Dr. Hima Loco: unable to fill per triage dept protocol criteria. Please advise.   Refill Protocol Appointment Criteria  · Appointment scheduled in the past 6 months or in the next 3 months  Recent Outpatient Visits            3 days ago     [facility]

## 2018-01-14 RX ORDER — ZOLPIDEM TARTRATE 10 MG/1
TABLET ORAL
Qty: 90 TABLET | Refills: 1 | Status: SHIPPED | OUTPATIENT
Start: 2018-01-14 | End: 2018-07-11

## 2018-01-15 ENCOUNTER — OFFICE VISIT (OUTPATIENT)
Dept: PHYSICAL THERAPY | Age: 73
End: 2018-01-15
Attending: ORTHOPAEDIC SURGERY
Payer: MEDICARE

## 2018-01-15 ENCOUNTER — APPOINTMENT (OUTPATIENT)
Dept: PHYSICAL THERAPY | Age: 73
End: 2018-01-15
Attending: ORTHOPAEDIC SURGERY
Payer: MEDICARE

## 2018-01-15 PROCEDURE — 97110 THERAPEUTIC EXERCISES: CPT

## 2018-01-15 NOTE — PROGRESS NOTES
Diagnosis: Left Total Hip Arthroplasty (Posterior approach)        Authorized # of Visits:  6/10 (11 total) MEDICARE         Next MD visit: January 30, 2018  Fall Risk: standard         Precautions: 100% weightbearing on surgical LLE, posterior precautions

## 2018-01-16 ENCOUNTER — OFFICE VISIT (OUTPATIENT)
Dept: PHYSICAL THERAPY | Age: 73
End: 2018-01-16
Attending: ORTHOPAEDIC SURGERY
Payer: MEDICARE

## 2018-01-16 ENCOUNTER — APPOINTMENT (OUTPATIENT)
Dept: PHYSICAL THERAPY | Age: 73
End: 2018-01-16
Attending: ORTHOPAEDIC SURGERY
Payer: MEDICARE

## 2018-01-16 PROCEDURE — 97110 THERAPEUTIC EXERCISES: CPT

## 2018-01-16 NOTE — PROGRESS NOTES
Diagnosis: Left Total Hip Arthroplasty (Posterior approach)        Authorized # of Visits:  7/10 (12 total) MEDICARE         Next MD visit: January 30, 2018  Fall Risk: standard         Precautions: 100% weightbearing on surgical LLE, posterior precautions

## 2018-01-16 NOTE — TELEPHONE ENCOUNTER
[facility] calling asking for a verbal order for the Zolpidem ordered on 1/14/18. Verbal order given.

## 2018-01-17 NOTE — TELEPHONE ENCOUNTER
Patient requesting Medicare Annual Wellness appointments to be scheduled  back to back with spouse Lucia Crowder  43 on 18 at 1:15pm and 1:45pm. The 1:15pm slot is not a designated \"Medicare\" slot.  Patient states they were scheduled  back to

## 2018-01-18 ENCOUNTER — TELEPHONE (OUTPATIENT)
Dept: PHYSICAL THERAPY | Age: 73
End: 2018-01-18

## 2018-01-18 ENCOUNTER — APPOINTMENT (OUTPATIENT)
Dept: PHYSICAL THERAPY | Age: 73
End: 2018-01-18
Attending: ORTHOPAEDIC SURGERY
Payer: MEDICARE

## 2018-01-22 ENCOUNTER — OFFICE VISIT (OUTPATIENT)
Dept: PHYSICAL THERAPY | Age: 73
End: 2018-01-22
Attending: ORTHOPAEDIC SURGERY
Payer: MEDICARE

## 2018-01-22 ENCOUNTER — APPOINTMENT (OUTPATIENT)
Dept: PHYSICAL THERAPY | Age: 73
End: 2018-01-22
Attending: ORTHOPAEDIC SURGERY
Payer: MEDICARE

## 2018-01-22 PROCEDURE — 97110 THERAPEUTIC EXERCISES: CPT

## 2018-01-22 NOTE — PROGRESS NOTES
Diagnosis: Left Total Hip Arthroplasty (Posterior approach)        Authorized # of Visits:  8/10 (13 total) MEDICARE         Next MD visit: January 30, 2018  Fall Risk: standard         Precautions: 100% weightbearing on surgical LLE, posterior precautions

## 2018-01-23 ENCOUNTER — OFFICE VISIT (OUTPATIENT)
Dept: PHYSICAL THERAPY | Age: 73
End: 2018-01-23
Attending: ORTHOPAEDIC SURGERY
Payer: MEDICARE

## 2018-01-23 ENCOUNTER — APPOINTMENT (OUTPATIENT)
Dept: PHYSICAL THERAPY | Age: 73
End: 2018-01-23
Attending: ORTHOPAEDIC SURGERY
Payer: MEDICARE

## 2018-01-23 PROCEDURE — 97110 THERAPEUTIC EXERCISES: CPT

## 2018-01-23 NOTE — PROGRESS NOTES
Diagnosis: Left Total Hip Arthroplasty (Posterior approach)        Authorized # of Visits: 9 /10 (14 total) MEDICARE         Next MD visit: January 30, 2018  Fall Risk: standard         Precautions: 100% weightbearing on surgical LLE, posterior precautions

## 2018-01-24 ENCOUNTER — OFFICE VISIT (OUTPATIENT)
Dept: OBGYN CLINIC | Facility: CLINIC | Age: 73
End: 2018-01-24

## 2018-01-24 VITALS
BODY MASS INDEX: 34 KG/M2 | DIASTOLIC BLOOD PRESSURE: 69 MMHG | WEIGHT: 172 LBS | HEART RATE: 79 BPM | SYSTOLIC BLOOD PRESSURE: 141 MMHG

## 2018-01-24 DIAGNOSIS — Z01.419 ENCOUNTER FOR GYNECOLOGICAL EXAMINATION WITHOUT ABNORMAL FINDING: Primary | ICD-10-CM

## 2018-01-24 DIAGNOSIS — L90.0 LICHEN SCLEROSUS: ICD-10-CM

## 2018-01-24 PROCEDURE — 99397 PER PM REEVAL EST PAT 65+ YR: CPT | Performed by: OBSTETRICS & GYNECOLOGY

## 2018-01-24 PROCEDURE — G0101 CA SCREEN;PELVIC/BREAST EXAM: HCPCS | Performed by: OBSTETRICS & GYNECOLOGY

## 2018-01-24 NOTE — PROGRESS NOTES
Well Woman Exam    HPI:  The patient is a 65yo female who presents for annual exam. She states she used new soap (Sweet Pea Fragrance) last night and has more itching and burning today. Reviewed no products with fragrance.  Pt states she was doing so well s TOTAL HIP REPLACEMENT Left      Comment: DR. Gem Veliz: TUBAL LIGATION    SOCIAL HISTORY:    Social History  Social History   Marital status:   Spouse name: N/A    Years of education: N/A  Number of children: N/A     Occupational History  None Sodium 100 MCG Oral Tab, Take 100 mcg by mouth before breakfast., Disp: , Rfl:   •  Oxybutynin Chloride ER 15 MG Oral Tablet 24 Hr, Take 1 tablet (15 mg total) by mouth daily. , Disp: 90 tablet, Rfl: 1  •  LISINOPRIL 40 MG Oral Tab, TAKE ONE TABLET BY MOUTH bilaterally   Lymphatic:no abnormal supraclavicular or axillary adenopathy is noted  Breast: normal without palpable masses, tenderness, asymmetry, nipple discharge, nipple retraction or skin changes  Abdomen:  soft, nontender, nondistended, no masses  Ski

## 2018-01-25 ENCOUNTER — OFFICE VISIT (OUTPATIENT)
Dept: PHYSICAL THERAPY | Age: 73
End: 2018-01-25
Attending: ORTHOPAEDIC SURGERY
Payer: MEDICARE

## 2018-01-25 ENCOUNTER — APPOINTMENT (OUTPATIENT)
Dept: PHYSICAL THERAPY | Age: 73
End: 2018-01-25
Attending: ORTHOPAEDIC SURGERY
Payer: MEDICARE

## 2018-01-25 PROCEDURE — 97110 THERAPEUTIC EXERCISES: CPT

## 2018-01-25 NOTE — PROGRESS NOTES
Diagnosis: Left Total Hip Arthroplasty (Posterior approach)        Authorized # of Visits: 10 /15 (15 total) MEDICARE         Next MD visit: January 30, 2018  Fall Risk: standard         Precautions: 100% weightbearing on surgical LLE, posterior precaution

## 2018-01-26 RX ORDER — CITALOPRAM 20 MG/1
TABLET ORAL
Qty: 90 TABLET | Refills: 0 | Status: SHIPPED | OUTPATIENT
Start: 2018-01-26 | End: 2018-05-04

## 2018-01-26 NOTE — TELEPHONE ENCOUNTER
Refill Protocol Appointment Criteria  · Appointment scheduled in the past 6 months or in the next 3 months  Recent Outpatient Visits            2 days ago Encounter for gynecological examination without abnormal finding    [address] Giselle Garcia 96

## 2018-01-29 ENCOUNTER — APPOINTMENT (OUTPATIENT)
Dept: PHYSICAL THERAPY | Age: 73
End: 2018-01-29
Attending: ORTHOPAEDIC SURGERY
Payer: MEDICARE

## 2018-01-30 ENCOUNTER — APPOINTMENT (OUTPATIENT)
Dept: PHYSICAL THERAPY | Age: 73
End: 2018-01-30
Attending: ORTHOPAEDIC SURGERY
Payer: MEDICARE

## 2018-01-30 ENCOUNTER — TELEPHONE (OUTPATIENT)
Dept: FAMILY MEDICINE CLINIC | Facility: CLINIC | Age: 73
End: 2018-01-30

## 2018-01-30 DIAGNOSIS — Z96.642 HISTORY OF TOTAL HIP ARTHROPLASTY, LEFT: Primary | ICD-10-CM

## 2018-01-31 ENCOUNTER — NURSE TRIAGE (OUTPATIENT)
Dept: OTHER | Age: 73
End: 2018-01-31

## 2018-01-31 ENCOUNTER — OFFICE VISIT (OUTPATIENT)
Dept: FAMILY MEDICINE CLINIC | Facility: CLINIC | Age: 73
End: 2018-01-31

## 2018-01-31 VITALS
WEIGHT: 173.81 LBS | BODY MASS INDEX: 35 KG/M2 | SYSTOLIC BLOOD PRESSURE: 156 MMHG | HEART RATE: 72 BPM | DIASTOLIC BLOOD PRESSURE: 66 MMHG

## 2018-01-31 DIAGNOSIS — M54.6 ACUTE RIGHT-SIDED THORACIC BACK PAIN: Primary | ICD-10-CM

## 2018-01-31 LAB
APPEARANCE: CLEAR
BILIRUBIN: NEGATIVE
GLUCOSE (URINE DIPSTICK): NEGATIVE MG/DL
KETONES (URINE DIPSTICK): NEGATIVE MG/DL
MULTISTIX LOT#: NORMAL NUMERIC
NITRITE, URINE: NEGATIVE
OCCULT BLOOD: NEGATIVE
PH, URINE: 6 (ref 4.5–8)
PROTEIN (URINE DIPSTICK): NEGATIVE MG/DL
SPECIFIC GRAVITY: 1.01 (ref 1–1.03)
URINE-COLOR: YELLOW
UROBILINOGEN,SEMI-QN: NEGATIVE MG/DL (ref 0–1.9)

## 2018-01-31 PROCEDURE — G0463 HOSPITAL OUTPT CLINIC VISIT: HCPCS | Performed by: FAMILY MEDICINE

## 2018-01-31 PROCEDURE — 81002 URINALYSIS NONAUTO W/O SCOPE: CPT | Performed by: FAMILY MEDICINE

## 2018-01-31 PROCEDURE — 99213 OFFICE O/P EST LOW 20 MIN: CPT | Performed by: FAMILY MEDICINE

## 2018-01-31 RX ORDER — LIDOCAINE 50 MG/G
1 PATCH TOPICAL EVERY 24 HOURS
Qty: 10 PATCH | Refills: 0 | Status: SHIPPED | OUTPATIENT
Start: 2018-01-31 | End: 2018-08-02

## 2018-01-31 RX ORDER — CYCLOBENZAPRINE HCL 5 MG
5 TABLET ORAL 3 TIMES DAILY PRN
Qty: 20 TABLET | Refills: 0 | Status: SHIPPED | OUTPATIENT
Start: 2018-01-31 | End: 2018-08-02

## 2018-01-31 NOTE — TELEPHONE ENCOUNTER
Action Requested: Summary for Provider     []  Critical Lab, Recommendations Needed  [] Need Additional Advice  []   FYI    []   Need Orders  [] Need Medications Sent to Pharmacy  []  Other     SUMMARY: Lili Hauser now with available provider but pt only wa

## 2018-01-31 NOTE — PROGRESS NOTES
Sonya Renee is a 67year old female. Patient presents with:  Back Pain    HPI:   Pain in right mid to lower back pain. Reports pain worsening over 3 weeks since PT maneuver  - was doing left leg lifts for PT for left hip replacements.    Been slight pa tablet Rfl: 3   FOLIC ACID 1 MG Oral Tab TAKE ONE TABLET BY MOUTH ONCE DAILY Disp: 90 tablet Rfl: 3   Calcium 600 MG Oral Tab Take 1,200 mg by mouth daily.  Take 2 tabs every day Disp:  Rfl:    Vitamin D3 (VITAMIN D3) 1000 UNITS Oral Tab Take 1 tablet by mo pain    EXAM:   /66 (BP Location: Left arm)   Pulse 72   Wt 173 lb 12.8 oz (78.8 kg)   BMI 34.52 kg/m²   GENERAL: well developed, well nourished,in no apparent distress  SKIN: no rashes,no suspicious lesions  GI: good BS's,no masses, HSM or tendernes

## 2018-02-01 ENCOUNTER — APPOINTMENT (OUTPATIENT)
Dept: PHYSICAL THERAPY | Age: 73
End: 2018-02-01
Attending: ORTHOPAEDIC SURGERY
Payer: MEDICARE

## 2018-02-02 RX ORDER — LISINOPRIL 40 MG/1
TABLET ORAL
Qty: 90 TABLET | Refills: 0 | Status: SHIPPED | OUTPATIENT
Start: 2018-02-02 | End: 2018-05-05

## 2018-02-02 NOTE — TELEPHONE ENCOUNTER
Vivian Marin calling to f/u on refill request. Routing to on-site provider.  Please see below and advise regarding pended refill request.

## 2018-02-02 NOTE — TELEPHONE ENCOUNTER
Patient's B/p during last visit was 156/66. Rx pended and routed to provider for review.       Hypertensive Medications  Protocol Criteria:  · Appointment scheduled in the past 6 months or in the next 3 months  · BMP or CMP in the past 12 months  · Creatini

## 2018-02-06 ENCOUNTER — OFFICE VISIT (OUTPATIENT)
Dept: PHYSICAL THERAPY | Age: 73
End: 2018-02-06
Attending: ORTHOPAEDIC SURGERY
Payer: MEDICARE

## 2018-02-06 PROCEDURE — 97110 THERAPEUTIC EXERCISES: CPT

## 2018-02-06 NOTE — PROGRESS NOTES
Diagnosis: Left Total Hip Arthroplasty (Posterior approach)        Authorized # of Visits: 11 /15 (16 total) MEDICARE         Next MD visit: January 30, 2018  Fall Risk: standard         Precautions: 100% weightbearing on surgical LLE, posterior precaution

## 2018-02-07 ENCOUNTER — TELEPHONE (OUTPATIENT)
Dept: FAMILY MEDICINE CLINIC | Facility: CLINIC | Age: 73
End: 2018-02-07

## 2018-02-08 ENCOUNTER — OFFICE VISIT (OUTPATIENT)
Dept: PHYSICAL THERAPY | Age: 73
End: 2018-02-08
Attending: ORTHOPAEDIC SURGERY
Payer: MEDICARE

## 2018-02-08 PROCEDURE — 97110 THERAPEUTIC EXERCISES: CPT

## 2018-02-08 NOTE — TELEPHONE ENCOUNTER
PA for Lidocaine 5% patches completed with [employer] via CMM response time 3-5 business days KEY [id number].

## 2018-02-08 NOTE — PROGRESS NOTES
Diagnosis: Left Total Hip Arthroplasty (Posterior approach)        Authorized # of Visits: 12 /15 (17 total) MEDICARE         Next MD visit: 4/17/18  Fall Risk: standard         Precautions: 100% weightbearing on surgical LLE, posterior precautions

## 2018-02-20 NOTE — TELEPHONE ENCOUNTER
PA denied. Plan states medication is covered for approved conditions; pain due to post herpetic neuralgia, pain due to diabetic neuropathy, neuropathic pain due to cancer.

## 2018-03-08 ENCOUNTER — TELEPHONE (OUTPATIENT)
Dept: INTERNAL MEDICINE CLINIC | Facility: CLINIC | Age: 73
End: 2018-03-08

## 2018-03-08 NOTE — TELEPHONE ENCOUNTER
Pt dropped off placard form to be completed by Dr. Marybeth Parsons like form to be mailed to her with a copy of it.

## 2018-03-13 RX ORDER — SIMVASTATIN 5 MG
TABLET ORAL
Qty: 90 TABLET | Refills: 0 | Status: SHIPPED | OUTPATIENT
Start: 2018-03-13 | End: 2018-06-08

## 2018-03-13 NOTE — TELEPHONE ENCOUNTER
Cholesterol Medications  Protocol Criteria:  · Appointment scheduled in the past 12 months or in the next 3 months  · ALT & LDL on file in the past 12 months  · ALT result < 80  · LDL result <130   Recent Outpatient Visits            1 month ago     Antione

## 2018-04-03 ENCOUNTER — APPOINTMENT (OUTPATIENT)
Dept: LAB | Age: 73
End: 2018-04-03
Attending: INTERNAL MEDICINE
Payer: MEDICARE

## 2018-04-03 DIAGNOSIS — M06.9 RHEUMATOID ARTHRITIS INVOLVING MULTIPLE SITES, UNSPECIFIED RHEUMATOID FACTOR PRESENCE: ICD-10-CM

## 2018-04-03 DIAGNOSIS — Z51.81 THERAPEUTIC DRUG MONITORING: ICD-10-CM

## 2018-04-03 PROCEDURE — 84460 ALANINE AMINO (ALT) (SGPT): CPT

## 2018-04-03 PROCEDURE — 36415 COLL VENOUS BLD VENIPUNCTURE: CPT

## 2018-04-03 PROCEDURE — 86140 C-REACTIVE PROTEIN: CPT

## 2018-04-03 PROCEDURE — 82565 ASSAY OF CREATININE: CPT

## 2018-04-03 PROCEDURE — 84450 TRANSFERASE (AST) (SGOT): CPT

## 2018-04-03 PROCEDURE — 85027 COMPLETE CBC AUTOMATED: CPT

## 2018-04-03 PROCEDURE — 85652 RBC SED RATE AUTOMATED: CPT

## 2018-04-05 ENCOUNTER — OFFICE VISIT (OUTPATIENT)
Dept: RHEUMATOLOGY | Facility: CLINIC | Age: 73
End: 2018-04-05

## 2018-04-05 VITALS
DIASTOLIC BLOOD PRESSURE: 68 MMHG | BODY MASS INDEX: 33.82 KG/M2 | HEIGHT: 59.5 IN | TEMPERATURE: 98 F | HEART RATE: 71 BPM | WEIGHT: 170 LBS | SYSTOLIC BLOOD PRESSURE: 131 MMHG

## 2018-04-05 DIAGNOSIS — M06.9 RHEUMATOID ARTHRITIS INVOLVING MULTIPLE SITES, UNSPECIFIED RHEUMATOID FACTOR PRESENCE: Primary | ICD-10-CM

## 2018-04-05 DIAGNOSIS — Z51.81 THERAPEUTIC DRUG MONITORING: ICD-10-CM

## 2018-04-05 PROCEDURE — 99214 OFFICE O/P EST MOD 30 MIN: CPT | Performed by: INTERNAL MEDICINE

## 2018-04-05 RX ORDER — HYDROCODONE BITARTRATE AND ACETAMINOPHEN 10; 325 MG/1; MG/1
1 TABLET ORAL EVERY 12 HOURS PRN
Qty: 60 TABLET | Refills: 0 | Status: SHIPPED | OUTPATIENT
Start: 2018-04-05 | End: 2018-05-05

## 2018-04-05 RX ORDER — PREGABALIN 50 MG/1
50 CAPSULE ORAL NIGHTLY
Qty: 30 CAPSULE | Refills: 1 | Status: SHIPPED | OUTPATIENT
Start: 2018-04-05 | End: 2018-05-31

## 2018-04-05 RX ORDER — HYDROCODONE BITARTRATE AND ACETAMINOPHEN 10; 325 MG/1; MG/1
1 TABLET ORAL EVERY 12 HOURS PRN
Qty: 60 TABLET | Refills: 0 | Status: SHIPPED | OUTPATIENT
Start: 2018-05-05 | End: 2018-06-04

## 2018-04-05 RX ORDER — HYDROCODONE BITARTRATE AND ACETAMINOPHEN 10; 325 MG/1; MG/1
1 TABLET ORAL EVERY 12 HOURS PRN
Qty: 60 TABLET | Refills: 0 | Status: SHIPPED | OUTPATIENT
Start: 2018-06-05 | End: 2018-07-05

## 2018-04-05 NOTE — PATIENT INSTRUCTIONS
1. Cont  Methotrexate 8 tablets a week and xeljanz 11mg ad ay   2. . Check labs today or in 1 month. 3. lyrica 50mg at night. 4. norco 10/325mg as needed. #60 with 3 refills. 5. Return to clinic in 3 months. 6. Cont.  Calcium 1500mg a day and vit d 8

## 2018-04-05 NOTE — PROGRESS NOTES
Evelia Nguyen is a 67year old female. HPI:   Patient presents with:  Rheumatoid Arthritis      I had the pleasure of seeing your patient Mrs. Pam Al  On 4/5/2018. Liliana Shaffer seen her on 1/4/2018. griffin last seen hero n 10/23/2017.  I had last see didn't feel a difference increase the ssz from 1000mg to 1500mg daily. She thought about the prolia - but she would like to try the fosamax again. She thinks that if she is dehyrdated she was causing the cramps. This year was the same thing.  She thinks i bloated. She would take ssz 1500mg bid but she felt bloated and had heartburn. She had consitpation. So she went down to ssz 1000mg bid. Then weaned off and feels better with her stomach. She's gained 30 pounds. lyrica also caused constipation.  She's o full weight . In rehab she was given percocet and it was stronger than norco. She had bad constiptiaon. She has 2/10 pain. She restarted norco - now really only taking it once a day - not the twice a day she was doing.  So she is taking it easy  - and triamcinolone acetonide 0.1 % External Ointment Apply 1 Application topically 2 (two) times daily.  Disp: 1 Tube Rfl: 11   ZOLPIDEM TARTRATE 10 MG Oral Tab TAKE ONE TABLET BY MOUTH ONCE DAILY AT BEDTIME AS NEEDED Disp: 90 tablet Rfl: 1   Tofacitinib [facility] intact, clear sclear, PERRLA, pleasant, no acute distress, no CAD, no neck tendnerness, good ROM,   No rashes  CVS: RRR, no murmurs  RS: CTAB, no crackles, no rhonchi  ABD: Soft Non tender,   Joint exam:   tendernesss in left gluteal areas and mid back - l osteoarthritis both hips. 3. Moderate osteoarthritis lower lumbar spine. 10/23/2017  Mri hips   =====  CONCLUSION: Moderate left hip osteoarthritis.      Tendinosis with partial thickness tearing of the left gluteus minimus and medius with trochanteric - she is declining prolia and reclast after reading about it. - d/w her about calcium with vitamin D     4. hypothryoidism - was on levothryoixine - changint the way she takes sit - will repeat in feb. -   5.  Lichen sclerosis - vaginal - better now - u

## 2018-04-09 RX ORDER — LEVOTHYROXINE SODIUM 0.1 MG/1
TABLET ORAL
Qty: 90 TABLET | Refills: 0 | Status: SHIPPED | OUTPATIENT
Start: 2018-04-09 | End: 2018-07-11

## 2018-04-09 NOTE — TELEPHONE ENCOUNTER
Hypothyroid Medications  Protocol Criteria:  Appointment scheduled in the past 12 months or the next 3 months  TSH resulted in the past 12 months that is normal  Recent Outpatient Visits            4 days ago Rheumatoid arthritis involving multiple sites,

## 2018-04-15 ENCOUNTER — PATIENT MESSAGE (OUTPATIENT)
Dept: FAMILY MEDICINE CLINIC | Facility: CLINIC | Age: 73
End: 2018-04-15

## 2018-04-17 ENCOUNTER — TELEPHONE (OUTPATIENT)
Dept: FAMILY MEDICINE CLINIC | Facility: CLINIC | Age: 73
End: 2018-04-17

## 2018-04-17 DIAGNOSIS — M79.606 PAIN OF LOWER EXTREMITY, UNSPECIFIED LATERALITY: ICD-10-CM

## 2018-04-17 DIAGNOSIS — M25.569 ACUTE KNEE PAIN, UNSPECIFIED LATERALITY: Primary | ICD-10-CM

## 2018-05-07 RX ORDER — CITALOPRAM 20 MG/1
TABLET ORAL
Qty: 90 TABLET | Refills: 0 | Status: SHIPPED | OUTPATIENT
Start: 2018-05-07 | End: 2018-07-28

## 2018-05-07 NOTE — TELEPHONE ENCOUNTER
for Psychiatric Non-Scheduled (Anti-Anxiety)  Refill Protocol Appointment Criteria  · Appointment scheduled in the past 6 months or in the next 3 months  Recent Outpatient Visits            2 weeks ago History of total hip arthroplasty, left    [address] [place] [address]

## 2018-05-08 RX ORDER — LISINOPRIL 40 MG/1
TABLET ORAL
Qty: 90 TABLET | Refills: 0 | Status: SHIPPED | OUTPATIENT
Start: 2018-05-08 | End: 2018-08-10

## 2018-05-08 NOTE — TELEPHONE ENCOUNTER
Hypertensive Medications  Protocol Criteria:  · Appointment scheduled in the past 6 months or in the next 3 months  · BMP or CMP in the past 12 months  · Creatinine result < 2  Recent Outpatient Visits            3 weeks ago History of total hip arthroplas

## 2018-05-23 ENCOUNTER — HOSPITAL ENCOUNTER (EMERGENCY)
Facility: HOSPITAL | Age: 73
Discharge: HOME | End: 2018-05-23
Payer: MEDICARE

## 2018-05-23 ENCOUNTER — HOSPITAL ENCOUNTER (OUTPATIENT)
Age: 73
Discharge: ANOTHER HOSPITAL | End: 2018-05-23
Attending: EMERGENCY MEDICINE
Payer: MEDICARE

## 2018-05-23 ENCOUNTER — APPOINTMENT (OUTPATIENT)
Dept: ULTRASOUND IMAGING | Facility: HOSPITAL | Age: 73
End: 2018-05-23
Payer: MEDICARE

## 2018-05-23 VITALS
WEIGHT: 165 LBS | TEMPERATURE: 98 F | HEIGHT: 59 IN | RESPIRATION RATE: 16 BRPM | BODY MASS INDEX: 33.26 KG/M2 | DIASTOLIC BLOOD PRESSURE: 65 MMHG | HEART RATE: 68 BPM | OXYGEN SATURATION: 97 % | SYSTOLIC BLOOD PRESSURE: 146 MMHG

## 2018-05-23 VITALS
BODY MASS INDEX: 33.26 KG/M2 | WEIGHT: 165 LBS | SYSTOLIC BLOOD PRESSURE: 146 MMHG | DIASTOLIC BLOOD PRESSURE: 81 MMHG | RESPIRATION RATE: 16 BRPM | TEMPERATURE: 98 F | HEIGHT: 59 IN | HEART RATE: 69 BPM | OXYGEN SATURATION: 95 %

## 2018-05-23 DIAGNOSIS — M71.21 BAKERS CYST, RIGHT: Primary | ICD-10-CM

## 2018-05-23 DIAGNOSIS — M79.661 RIGHT CALF PAIN: Primary | ICD-10-CM

## 2018-05-23 DIAGNOSIS — M79.661 RIGHT CALF PAIN: ICD-10-CM

## 2018-05-23 PROCEDURE — 99284 EMERGENCY DEPT VISIT MOD MDM: CPT

## 2018-05-23 PROCEDURE — 93971 EXTREMITY STUDY: CPT

## 2018-05-23 PROCEDURE — 99213 OFFICE O/P EST LOW 20 MIN: CPT

## 2018-05-23 RX ORDER — IBUPROFEN 600 MG/1
TABLET ORAL
Qty: 20 TABLET | Refills: 0 | Status: SHIPPED | OUTPATIENT
Start: 2018-05-23 | End: 2018-08-02

## 2018-05-23 RX ORDER — HYDROCODONE BITARTRATE AND ACETAMINOPHEN 5; 325 MG/1; MG/1
1 TABLET ORAL EVERY 6 HOURS PRN
Qty: 12 TABLET | Refills: 0 | Status: SHIPPED | OUTPATIENT
Start: 2018-05-23 | End: 2018-05-30

## 2018-05-23 NOTE — ED PROVIDER NOTES
Patient Seen in: [facility] In [address] [place] [address]    History   Patient presents with:  Leg Pain    Stated Complaint: r leg pain    HPI    Patient is a 28-year-old female with past history of diabetes, hyperlipidemia, hypertension who presents 0.0 oz/week     Comment: socially       Review of Systems    Positive for stated complaint: r leg pain  Other systems are as noted in HPI. Constitutional and vital signs reviewed. All other systems reviewed and negative except as noted above.

## 2018-05-23 NOTE — ED PROVIDER NOTES
Patient Seen in: [facility] AND [facility] Emergency Department    History   Patient presents with:  Deep Vein Thrombosis (cardiovascular)    Stated Complaint: calf pain    HPI    77-year-old female presents with chief complaint of right calf pain.   Onset 2 days [place] HYDROcodone-acetaminophen 5-325 MG Oral Tab,  Take 1 tablet by mouth every 6 (six) hours as needed for Pain (Severe pain).    ibuprofen 600 MG Oral Tab,  Take 1 tablet (600 mg total) by mouth every 6 hours with food   LISINOPRIL 40 MG Oral Tab,  TAKE ON 1000 UNITS Oral Tab,  Take 1 tablet by mouth daily. Patient taking differently: Take 2,000 Units by mouth daily. Omega-3 Fatty Acids (FISH OIL OR),  Take  by mouth. Take 1 cap every day   famotidine (PEPCID) 10 MG Oral Tab,  Take by mouth daily.      Sharilyn Butter There is no tenderness to the chest wall. Respiratory: Respiratory effort was normal.  There is no rales, wheezes, or rhonchi. There is no stridor. Air entry is equal.  Cardiovascular: Regular rate and rhythm, no murmurs, gallops, rubs.   Capillary refil with and patient seen by Dr. Dayna Lynn.     Disposition and Plan     Clinical Impression:  Bakers cyst, right  (primary encounter diagnosis)  Right calf pain    Disposition:  Discharge    Follow-up:  Luz Marina Castellanos MD  [address]

## 2018-05-23 NOTE — ED INITIAL ASSESSMENT (HPI)
Right leg pain for 3 weeks, pain has increased and has traveled to calf area  Pt.  Unable to control pain at home

## 2018-06-01 NOTE — TELEPHONE ENCOUNTER
LOV: 4/5/18  Last Refilled:Lyrica#30, 1rf 4/5/18   Methotrexate#32,  0rfs 1/9/18  Labs:AST 28  ALT 20  4/3/18    Future Appointments  Date Time Provider Guido Bardalesi   6/5/2018 8:00 AM MD HARRY Au   7/25/2018 8:20 AM Jones Jafef

## 2018-06-10 RX ORDER — SIMVASTATIN 5 MG
TABLET ORAL
Qty: 90 TABLET | Refills: 1 | Status: SHIPPED | OUTPATIENT
Start: 2018-06-10 | End: 2018-08-22

## 2018-06-16 RX ORDER — OXYBUTYNIN CHLORIDE 15 MG/1
TABLET, EXTENDED RELEASE ORAL
Qty: 90 TABLET | Refills: 0 | Status: SHIPPED | OUTPATIENT
Start: 2018-06-16 | End: 2018-08-22

## 2018-06-16 NOTE — TELEPHONE ENCOUNTER
Refill Protocol Appointment Criteria  · Appointment scheduled in the past 12 months or in the next 3 months  Recent Outpatient Visits            2 months ago History of total hip arthroplasty, left    [address] [facility] [address] Emily Baig MD    Office Visit

## 2018-07-11 RX ORDER — LEVOTHYROXINE SODIUM 0.1 MG/1
TABLET ORAL
Qty: 90 TABLET | Refills: 0 | Status: SHIPPED | OUTPATIENT
Start: 2018-07-11 | End: 2018-08-22

## 2018-07-12 RX ORDER — ZOLPIDEM TARTRATE 10 MG/1
TABLET ORAL
Qty: 90 TABLET | Refills: 1 | Status: SHIPPED | OUTPATIENT
Start: 2018-07-12 | End: 2018-08-22

## 2018-07-12 NOTE — TELEPHONE ENCOUNTER
LOV: 1-31-18 Last Rx: 1-14-18     Please advise in regards to refill request. Thank You    Please respond to pool: EM FM ADO LPN/CMA    Hypothyroid Medications  Protocol Criteria:  Appointment scheduled in the past 12 months or the next 3 months  TSH resulted in the past 12 months that is normal  Recent Outpatient Visits            2 months ago History of total hip arthroplasty, left    [address]  Polly Johnson MD    Office Visit    3 months ago Rheumatoid arthritis involving multiple sites, unspecified rheumatoid factor presence [facility])    [facility], [facility], [address], [place] Mary Kothari MD    Office Visit    5 months ago     SILVANA Reaagn in [facility] Mariann Felton PTA    Office Visit    5 months ago     [facility]    Office Visit    5 months ago Acute right-sided thoracic back pain    [facility], [address], Juan Schilling, Ronald Escobar MD    Office Visit        Future Appointments       Provider Department Appt Notes    In 2 weeks Jodi Carrillo MD [facility], [address], [employer] 6 months fu    In 2 weeks Mary Kothari MD [facility], [facility] 3 mos follow up    In 1 month Diana Perkins MD [facility], [facility], [place] medicare px per LBB see comm -           Lab Results  Component Value Date   TSH 0.96 10/12/2017   THYROIDFUNC 0.05 (L) 12/16/2014       Refilled per protocol

## 2018-07-24 ENCOUNTER — TELEPHONE (OUTPATIENT)
Dept: OTHER | Age: 73
End: 2018-07-24

## 2018-07-24 ENCOUNTER — PATIENT MESSAGE (OUTPATIENT)
Dept: FAMILY MEDICINE CLINIC | Facility: CLINIC | Age: 73
End: 2018-07-24

## 2018-07-24 DIAGNOSIS — M25.561 ACUTE PAIN OF RIGHT KNEE: Primary | ICD-10-CM

## 2018-07-24 NOTE — TELEPHONE ENCOUNTER
Per pt right knee pain x 3 months. Felt better last 2 weeks and progressive got worse 1 week ago. Pain 10/10. Aggravated by walking. Applied ice pain 3/10. Swelling, verbalized having a Baker cyst behind the knee.  Pt is requesting referral to Dr. Maria Antonia Montgomery

## 2018-07-24 NOTE — TELEPHONE ENCOUNTER
From: Bryant Hendrix  To: Luz Marina Castellanos MD  Sent: 7/24/2018 4:09 PM CDT  Subject: Other    This is somewhat urgent. I need to know which doctor that can help me. In May of this year I was diagnosed   with having a Baker cyst behind my right knee.  I hav

## 2018-07-24 NOTE — TELEPHONE ENCOUNTER
----- Message from Hardeep Stroud sent at 7/24/2018  4:09 PM CDT -----  Regarding: Other  Contact: [phone]  This is somewhat urgent. I need to know which doctor that can help me.   In May of this year I was diagnosed   with having a Baker cyst behind

## 2018-07-25 ENCOUNTER — OFFICE VISIT (OUTPATIENT)
Dept: OBGYN CLINIC | Facility: CLINIC | Age: 73
End: 2018-07-25

## 2018-07-25 VITALS — SYSTOLIC BLOOD PRESSURE: 147 MMHG | DIASTOLIC BLOOD PRESSURE: 70 MMHG | BODY MASS INDEX: 33 KG/M2 | WEIGHT: 161 LBS

## 2018-07-25 DIAGNOSIS — L90.0 LICHEN SCLEROSUS: Primary | ICD-10-CM

## 2018-07-25 DIAGNOSIS — Z12.31 ENCOUNTER FOR SCREENING MAMMOGRAM FOR BREAST CANCER: ICD-10-CM

## 2018-07-25 PROCEDURE — 99213 OFFICE O/P EST LOW 20 MIN: CPT | Performed by: OBSTETRICS & GYNECOLOGY

## 2018-07-25 NOTE — PROGRESS NOTES
Marlys Meadows is a 68year old female  No LMP recorded. Patient is postmenopausal. Patient presents with:  Gyn Problem: F/UP VISIT  Patient presents for follow up for Lichen Sclerosis. She is doing well.  She continues to walk daily (battling a bake TUBAL LIGATION    SOCIAL HISTORY:    Social History  Social History   Marital status:   Spouse name: N/A    Years of education: N/A  Number of children: N/A     Occupational History  None on file     Social History Main Topics   Smoking status:  Form onto the skin daily. Apply for 12 hours, Disp: 10 patch, Rfl: 0  •  Cyclobenzaprine HCl 5 MG Oral Tab, Take 1 tablet (5 mg total) by mouth 3 (three) times daily as needed for Muscle spasms. , Disp: 20 tablet, Rfl: 0  •  Tofacitinib Citrate (XELJANZ XR) 11 M Meatus:  normal in size, location, without lesions and prolapse  Perineum: normal      Assessment & Plan:  Cassy Parents was seen today for gyn problem.     Diagnoses and all orders for this visit:    Lichen sclerosus    Encounter for screening mammogram for breast

## 2018-07-26 ENCOUNTER — OFFICE VISIT (OUTPATIENT)
Dept: RHEUMATOLOGY | Facility: CLINIC | Age: 73
End: 2018-07-26

## 2018-07-26 VITALS
HEART RATE: 66 BPM | WEIGHT: 160 LBS | DIASTOLIC BLOOD PRESSURE: 64 MMHG | BODY MASS INDEX: 32.25 KG/M2 | SYSTOLIC BLOOD PRESSURE: 116 MMHG | HEIGHT: 59 IN

## 2018-07-26 DIAGNOSIS — Z51.81 THERAPEUTIC DRUG MONITORING: ICD-10-CM

## 2018-07-26 DIAGNOSIS — M06.9 RHEUMATOID ARTHRITIS INVOLVING MULTIPLE SITES, UNSPECIFIED RHEUMATOID FACTOR PRESENCE: Primary | ICD-10-CM

## 2018-07-26 DIAGNOSIS — M81.0 AGE-RELATED OSTEOPOROSIS WITHOUT CURRENT PATHOLOGICAL FRACTURE: ICD-10-CM

## 2018-07-26 PROCEDURE — 99214 OFFICE O/P EST MOD 30 MIN: CPT | Performed by: INTERNAL MEDICINE

## 2018-07-26 RX ORDER — HYDROCODONE BITARTRATE AND ACETAMINOPHEN 10; 325 MG/1; MG/1
1 TABLET ORAL DAILY PRN
Qty: 30 TABLET | Refills: 0 | Status: SHIPPED | OUTPATIENT
Start: 2018-09-26 | End: 2018-08-02

## 2018-07-26 RX ORDER — HYDROCODONE BITARTRATE AND ACETAMINOPHEN 10; 325 MG/1; MG/1
1 TABLET ORAL DAILY PRN
Qty: 30 TABLET | Refills: 0 | Status: SHIPPED | OUTPATIENT
Start: 2018-08-26 | End: 2018-08-02

## 2018-07-26 RX ORDER — HYDROCODONE BITARTRATE AND ACETAMINOPHEN 10; 325 MG/1; MG/1
1 TABLET ORAL DAILY PRN
Qty: 30 TABLET | Refills: 0 | Status: SHIPPED | OUTPATIENT
Start: 2018-07-26 | End: 2018-08-25

## 2018-07-26 NOTE — PATIENT INSTRUCTIONS
1. Cont  Methotrexate 8 tablets a week and xeljanz 11mg ad ay   2. . Check labs today . 3. lyrica 50mg at night. 4. norco 10/325mg as needed. #30 with 2 refills. 5. Return to clinic in 3 months. 6. Cont. Calcium 1500mg a day and vit d 800units   7.

## 2018-07-26 NOTE — PROGRESS NOTES
Raphael Chaparro is a 68year old female. HPI:   Patient presents with:  Rheumatoid Arthritis  Joint Pain      I had the pleasure of seeing your patient Mrs. Alyson Meraz  On  7/26/2018. I had last seen her on 4/5/2018. Ines Valenzuela seen her on 1/4/2018.   i 8 tablets a week and ssz 1500mg a day. She didn't feel a difference increase the ssz from 1000mg to 1500mg daily. She thought about the prolia - but she would like to try the fosamax again. She thinks that if she is dehyrdated she was causing the cramps. was still having pain. Then she was so bloated. She would take ssz 1500mg bid but she felt bloated and had heartburn. She had consitpation. So she went down to ssz 1000mg bid. Then weaned off and feels better with her stomach. She's gained 30 pounds.    l right hip at [facility]. She's advanced to full weight . In rehab she was given percocet and it was stronger than norco. She had bad constiptiaon. She has 2/10 pain.    She restarted norco - now really only taking it once a day - not the twice a day she wa ZOLPIDEM TARTRATE 10 MG Oral Tab TAKE ONE TABLET BY MOUTH ONCE DAILY AT BEDTIME AS NEEDED Disp: 90 tablet Rfl: 1   LEVOTHYROXINE SODIUM 100 MCG Oral Tab TAKE 1 TABLET (100 MCG TOTAL) BY MOUTH ONCE DAILY.  STOP  MCG Disp: 90 tablet Rfl: 0   OXYBUTYN with food Disp: 20 tablet Rfl: 0   lidocaine 5 % External Patch Place 1 patch onto the skin daily.  Apply for 12 hours Disp: 10 patch Rfl: 0   Cyclobenzaprine HCl 5 MG Oral Tab Take 1 tablet (5 mg total) by mouth 3 (three) times daily as needed for Muscle s changes bilaterally. 2. Considerable osteoporosis. 3. Probable old fracture left ulnar styloid. No erosions. DEXA 4/28/2016  LEFT FEMORAL NECK       BMD:  0.474 gm/sq.  cm.    T SCORE: -3.4      Change since previous:  14% decrease     PA LUMBAR SPIN then orencia infusions - felt it didn'thelp   - MRI hand /wrist done in 2002 - will check xrays of her hands today -   Refill norco 3 months.     - she will think about ssz - but she feels good - d/w her that 3 drug could prevent progression in her hands =

## 2018-07-30 ENCOUNTER — APPOINTMENT (OUTPATIENT)
Dept: LAB | Age: 73
End: 2018-07-30
Attending: INTERNAL MEDICINE
Payer: MEDICARE

## 2018-07-30 DIAGNOSIS — M06.9 RHEUMATOID ARTHRITIS INVOLVING MULTIPLE SITES, UNSPECIFIED RHEUMATOID FACTOR PRESENCE: ICD-10-CM

## 2018-07-30 DIAGNOSIS — Z51.81 THERAPEUTIC DRUG MONITORING: ICD-10-CM

## 2018-07-30 LAB
ALT SERPL-CCNC: 19 U/L (ref 14–54)
AST SERPL-CCNC: 30 U/L (ref 15–41)
CREAT SERPL-MCNC: 1.24 MG/DL (ref 0.5–1.5)
CRP SERPL-MCNC: 0.7 MG/DL (ref 0–0.9)
ERYTHROCYTE [DISTWIDTH] IN BLOOD BY AUTOMATED COUNT: 15.9 % (ref 11–15)
ERYTHROCYTE [SEDIMENTATION RATE] IN BLOOD: 15 MM/HR (ref 0–30)
HCT VFR BLD AUTO: 33 % (ref 35–48)
HGB BLD-MCNC: 11.1 G/DL (ref 12–16)
MCH RBC QN AUTO: 33.3 PG (ref 27–32)
MCHC RBC AUTO-ENTMCNC: 33.7 G/DL (ref 32–37)
MCV RBC AUTO: 98.9 FL (ref 80–100)
PLATELET # BLD AUTO: 211 K/UL (ref 140–400)
PMV BLD AUTO: 8.8 FL (ref 7.4–10.3)
RBC # BLD AUTO: 3.34 M/UL (ref 3.7–5.4)
WBC # BLD AUTO: 5.1 K/UL (ref 4–11)

## 2018-07-30 PROCEDURE — 82565 ASSAY OF CREATININE: CPT

## 2018-07-30 PROCEDURE — 84460 ALANINE AMINO (ALT) (SGPT): CPT

## 2018-07-30 PROCEDURE — 85027 COMPLETE CBC AUTOMATED: CPT

## 2018-07-30 PROCEDURE — 84450 TRANSFERASE (AST) (SGOT): CPT

## 2018-07-30 PROCEDURE — 85652 RBC SED RATE AUTOMATED: CPT

## 2018-07-30 PROCEDURE — 86140 C-REACTIVE PROTEIN: CPT

## 2018-07-30 PROCEDURE — 36415 COLL VENOUS BLD VENIPUNCTURE: CPT

## 2018-07-30 RX ORDER — CITALOPRAM 20 MG/1
TABLET ORAL
Qty: 30 TABLET | Refills: 0 | Status: SHIPPED | OUTPATIENT
Start: 2018-07-30 | End: 2018-08-22

## 2018-07-30 NOTE — TELEPHONE ENCOUNTER
Refill Protocol Appointment Criteria  · Appointment scheduled in the past 6 months or in the next 3 months  Recent Outpatient Visits            4 days ago Rheumatoid arthritis involving multiple sites, unspecified rheumatoid factor presence ([facility]    Marissa Parrish

## 2018-08-01 ENCOUNTER — HOSPITAL ENCOUNTER (OUTPATIENT)
Dept: MAMMOGRAPHY | Age: 73
Discharge: HOME | End: 2018-08-01
Attending: OBSTETRICS & GYNECOLOGY
Payer: MEDICARE

## 2018-08-01 ENCOUNTER — HOSPITAL ENCOUNTER (OUTPATIENT)
Dept: BONE DENSITY | Age: 73
Discharge: HOME | End: 2018-08-01
Attending: INTERNAL MEDICINE
Payer: MEDICARE

## 2018-08-01 DIAGNOSIS — M81.0 AGE-RELATED OSTEOPOROSIS WITHOUT CURRENT PATHOLOGICAL FRACTURE: ICD-10-CM

## 2018-08-01 DIAGNOSIS — Z12.31 ENCOUNTER FOR SCREENING MAMMOGRAM FOR BREAST CANCER: ICD-10-CM

## 2018-08-01 PROCEDURE — 77067 SCR MAMMO BI INCL CAD: CPT | Performed by: OBSTETRICS & GYNECOLOGY

## 2018-08-01 PROCEDURE — 77080 DXA BONE DENSITY AXIAL: CPT | Performed by: INTERNAL MEDICINE

## 2018-08-02 PROBLEM — Z96.651 HISTORY OF TOTAL RIGHT KNEE REPLACEMENT: Status: ACTIVE | Noted: 2018-08-02

## 2018-08-02 PROBLEM — M25.561 ACUTE PAIN OF RIGHT KNEE: Status: ACTIVE | Noted: 2018-08-02

## 2018-08-09 ENCOUNTER — HOSPITAL ENCOUNTER (OUTPATIENT)
Dept: MAMMOGRAPHY | Facility: HOSPITAL | Age: 73
Discharge: HOME | End: 2018-08-09
Attending: OBSTETRICS & GYNECOLOGY
Payer: MEDICARE

## 2018-08-09 DIAGNOSIS — R92.8 ABNORMAL MAMMOGRAM: ICD-10-CM

## 2018-08-09 PROCEDURE — 77065 DX MAMMO INCL CAD UNI: CPT | Performed by: OBSTETRICS & GYNECOLOGY

## 2018-08-09 PROCEDURE — 77061 BREAST TOMOSYNTHESIS UNI: CPT | Performed by: OBSTETRICS & GYNECOLOGY

## 2018-08-10 RX ORDER — LISINOPRIL 40 MG/1
TABLET ORAL
Qty: 90 TABLET | Refills: 0 | Status: SHIPPED | OUTPATIENT
Start: 2018-08-10 | End: 2018-08-22

## 2018-08-11 NOTE — TELEPHONE ENCOUNTER
Hypertensive Medications  Protocol Criteria:  · Appointment scheduled in the past 6 months or in the next 3 months  · BMP or CMP in the past 12 months  · Creatinine result < 2  Recent Outpatient Visits            1 week ago History of total right knee repl

## 2018-08-14 PROBLEM — M47.812 CERVICAL ARTHRITIS: Status: ACTIVE | Noted: 2018-08-14

## 2018-08-14 PROBLEM — M47.816 FACET ARTHRITIS OF LUMBAR REGION: Status: ACTIVE | Noted: 2018-08-14

## 2018-08-18 ENCOUNTER — HOSPITAL ENCOUNTER (OUTPATIENT)
Dept: CT IMAGING | Facility: HOSPITAL | Age: 73
Discharge: HOME | End: 2018-08-18
Attending: ORTHOPAEDIC SURGERY
Payer: MEDICARE

## 2018-08-18 DIAGNOSIS — Z96.651 HISTORY OF TOTAL RIGHT KNEE REPLACEMENT: ICD-10-CM

## 2018-08-18 PROCEDURE — 73700 CT LOWER EXTREMITY W/O DYE: CPT | Performed by: ORTHOPAEDIC SURGERY

## 2018-08-22 ENCOUNTER — OFFICE VISIT (OUTPATIENT)
Dept: FAMILY MEDICINE CLINIC | Facility: CLINIC | Age: 73
End: 2018-08-22

## 2018-08-22 VITALS
SYSTOLIC BLOOD PRESSURE: 123 MMHG | WEIGHT: 164.81 LBS | HEIGHT: 58 IN | HEART RATE: 59 BPM | DIASTOLIC BLOOD PRESSURE: 58 MMHG | BODY MASS INDEX: 34.6 KG/M2

## 2018-08-22 DIAGNOSIS — Z96.651 HISTORY OF TOTAL RIGHT KNEE REPLACEMENT: ICD-10-CM

## 2018-08-22 DIAGNOSIS — F17.201 TOBACCO DEPENDENCE IN REMISSION: ICD-10-CM

## 2018-08-22 DIAGNOSIS — E66.01 SEVERE OBESITY (BMI 35.0-39.9) WITH COMORBIDITY (HCC): ICD-10-CM

## 2018-08-22 DIAGNOSIS — I70.0 ATHEROSCLEROSIS OF AORTA (HCC): ICD-10-CM

## 2018-08-22 DIAGNOSIS — E55.9 VITAMIN D DEFICIENCY: ICD-10-CM

## 2018-08-22 DIAGNOSIS — R01.1 CARDIAC MURMUR: Primary | ICD-10-CM

## 2018-08-22 DIAGNOSIS — M47.812 CERVICAL ARTHRITIS: ICD-10-CM

## 2018-08-22 DIAGNOSIS — Z00.00 ENCOUNTER FOR ANNUAL HEALTH EXAMINATION: ICD-10-CM

## 2018-08-22 DIAGNOSIS — M79.7 FIBROMYALGIA: ICD-10-CM

## 2018-08-22 DIAGNOSIS — E78.2 MIXED HYPERLIPIDEMIA: ICD-10-CM

## 2018-08-22 DIAGNOSIS — E03.9 HYPOTHYROIDISM, UNSPECIFIED TYPE: ICD-10-CM

## 2018-08-22 DIAGNOSIS — M81.8 OTHER OSTEOPOROSIS WITHOUT CURRENT PATHOLOGICAL FRACTURE: ICD-10-CM

## 2018-08-22 DIAGNOSIS — M47.816 FACET ARTHRITIS OF LUMBAR REGION: ICD-10-CM

## 2018-08-22 DIAGNOSIS — Z96.642 HISTORY OF TOTAL HIP ARTHROPLASTY, LEFT: ICD-10-CM

## 2018-08-22 DIAGNOSIS — I10 ESSENTIAL HYPERTENSION: ICD-10-CM

## 2018-08-22 DIAGNOSIS — M05.752 RHEUMATOID ARTHRITIS INVOLVING LEFT HIP WITH POSITIVE RHEUMATOID FACTOR (HCC): ICD-10-CM

## 2018-08-22 PROBLEM — M25.561 ACUTE PAIN OF RIGHT KNEE: Status: RESOLVED | Noted: 2018-08-02 | Resolved: 2018-08-22

## 2018-08-22 PROBLEM — Z01.818 PRE-OP TESTING: Status: RESOLVED | Noted: 2017-10-29 | Resolved: 2018-08-22

## 2018-08-22 PROBLEM — M25.552 LEFT HIP PAIN: Status: RESOLVED | Noted: 2017-10-10 | Resolved: 2018-08-22

## 2018-08-22 PROBLEM — M16.12 ARTHRITIS OF LEFT HIP: Status: RESOLVED | Noted: 2017-10-10 | Resolved: 2018-08-22

## 2018-08-22 PROCEDURE — G0439 PPPS, SUBSEQ VISIT: HCPCS | Performed by: FAMILY MEDICINE

## 2018-08-22 PROCEDURE — 90670 PCV13 VACCINE IM: CPT | Performed by: FAMILY MEDICINE

## 2018-08-22 PROCEDURE — 96160 PT-FOCUSED HLTH RISK ASSMT: CPT | Performed by: FAMILY MEDICINE

## 2018-08-22 PROCEDURE — G0009 ADMIN PNEUMOCOCCAL VACCINE: HCPCS | Performed by: FAMILY MEDICINE

## 2018-08-22 RX ORDER — ALENDRONATE SODIUM 70 MG/1
TABLET ORAL
Qty: 12 TABLET | Refills: 3 | Status: SHIPPED | OUTPATIENT
Start: 2018-08-22 | End: 2019-05-02

## 2018-08-22 RX ORDER — ZOLPIDEM TARTRATE 10 MG/1
TABLET ORAL
Qty: 90 TABLET | Refills: 4 | Status: SHIPPED | OUTPATIENT
Start: 2018-08-22 | End: 2019-03-12

## 2018-08-22 RX ORDER — LISINOPRIL 40 MG/1
40 TABLET ORAL
Qty: 90 TABLET | Refills: 4 | Status: SHIPPED | OUTPATIENT
Start: 2018-08-22 | End: 2019-11-08

## 2018-08-22 RX ORDER — LEVOTHYROXINE SODIUM 0.1 MG/1
TABLET ORAL
Qty: 90 TABLET | Refills: 4 | Status: SHIPPED | OUTPATIENT
Start: 2018-08-22 | End: 2019-12-02

## 2018-08-22 RX ORDER — SIMVASTATIN 5 MG
TABLET ORAL
Qty: 90 TABLET | Refills: 4 | Status: SHIPPED | OUTPATIENT
Start: 2018-08-22 | End: 2019-11-08

## 2018-08-22 RX ORDER — CITALOPRAM 20 MG/1
20 TABLET ORAL
Qty: 90 TABLET | Refills: 4 | Status: SHIPPED | OUTPATIENT
Start: 2018-08-22 | End: 2019-11-08

## 2018-08-22 RX ORDER — OXYBUTYNIN CHLORIDE 15 MG/1
TABLET, EXTENDED RELEASE ORAL
Qty: 90 TABLET | Refills: 4 | Status: SHIPPED | OUTPATIENT
Start: 2018-08-22 | End: 2019-11-08

## 2018-08-22 NOTE — PATIENT INSTRUCTIONS
[facility]   Tests on this list are recommended by your physician but may not be covered, or covered at this frequency, by your insurer. Please check with your insurance carrier before scheduling to verify coverage.    PREVENTATIV Men who are 73-68 years old and have smoked more than 100 cigarettes in their lifetime   • Anyone with a family history    Colorectal Cancer Screening  Covered up to Age 76     Colonoscopy Screen   Covered every 10 years- more often if abnormal Colonoscopy Influenza  Covered Annually No orders found for this or any previous visit.  Please get every year    Pneumococcal 13 (Prevnar)  Covered Once after 65   Orders placed or performed in visit on 08/22/18  -PNEUMOCOCCAL VACC, 13 BHUPENDRA IM    Please get once aft

## 2018-08-22 NOTE — PROGRESS NOTES
HPI:   Devin Pillai is a 68year old female who presents for a Medicare Subsequent Annual Wellness visit (Pt already had Initial Annual Wellness). Doing ok. Having some pain in right leg know - hip and knee. Walks daily 2-3 miles.    Annual Physical Yasmani Scott was screened for Alcohol abuse and had a score of 0 so is at low risk.     Patient Care Team: Patient Care Team:  Sheryl Al MD as PCP - General    Patient Active Problem List:     Rheumatoid arthritis involving left hip ([address]     Hyperlipide MOUTH ONCE A WEEK   Levothyroxine Sodium 100 MCG Oral Tab TAKE 1 TABLET (100 MCG TOTAL) BY MOUTH ONCE DAILY.    HYDROcodone-acetaminophen  MG Oral Tab Take 1 tablet by mouth daily as needed for Pain.   triamcinolone acetonide 0.1 % External Ointment A maternal grandmother; Heart Disorder in her father and mother; Hypertension in her mother. SOCIAL HISTORY:   She  reports that she quit smoking about 10 years ago. Her smoking use included Cigarettes. She smoked 0.00 packs per day for 20.00 years.  She ha ask people to repeat themselves:  No   I especially have trouble understanding the speech of women and children:  No I have trouble understanding the speaker in a large room such as at a meeting or place of Caodaism:  No   Many people I talk to seem to mumb (Prevnar 13) 08/22/2018        ASSESSMENT AND OTHER RELEVANT CHRONIC CONDITIONS:   Evelia Nguyen is a 68year old female who presents for a Medicare Assessment. PLAN SUMMARY:   1. Cardiac murmur  Echo in 2015 mild aortic regurg. No sob or CP. 2.  Kenny Raymond positive mental well-being?: Social Interaction; Visiting Friends; Visiting Family      This section provided for quick review of chart, separate sheet to patient  [address] Internal Lab or Procedure External Lab or Proc applicable     Immunizations (Update Immunization Activity if applicable)     Influenza  Covered Annually 12/14/2017 Please get every year    Pneumococcal 15 (Prevnar)  Covered Once after 65 No vaccine history found Please get once after your 65th birthday

## 2018-09-16 PROBLEM — M25.569 ACUTE KNEE PAIN: Status: ACTIVE | Noted: 2018-08-02

## 2018-09-28 ENCOUNTER — HOSPITAL ENCOUNTER (OUTPATIENT)
Dept: GENERAL RADIOLOGY | Age: 73
Discharge: HOME | End: 2018-09-28
Attending: FAMILY MEDICINE
Payer: MEDICARE

## 2018-09-28 DIAGNOSIS — R07.9 CHEST PAIN, UNSPECIFIED TYPE: ICD-10-CM
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
## 2018-09-28 PROCEDURE — 71046 X-RAY EXAM CHEST 2 VIEWS: CPT | Performed by: FAMILY MEDICINE

## 2018-09-29 NOTE — PROGRESS NOTES
Tests are all normal. Chest x-ray showed no fractures.  If pain not improving, please follow up. - Dr. Isidra Lehman

## 2018-10-01 RX ORDER — FOLIC ACID 1 MG/1
TABLET ORAL
Qty: 90 TABLET | Refills: 3 | Status: SHIPPED | OUTPATIENT
Start: 2018-10-01 | End: 2019-05-02

## 2018-10-01 RX ORDER — TOFACITINIB 11 MG/1
TABLET, FILM COATED, EXTENDED RELEASE ORAL
Qty: 90 TABLET | Refills: 1 | Status: SHIPPED | OUTPATIENT
Start: 2018-10-01 | End: 2019-03-18

## 2018-10-01 NOTE — TELEPHONE ENCOUNTER
LOV: 7/26/18  Last Refilled:#90, 3rfs 8/7/17      Future Appointments   Date Time Provider Guido Hoover   11/1/2018  2:30 PM Mariano Castro MD [facility] [place]   1/30/2019 11:40 AM [facility]       Please advise.

## 2018-10-01 NOTE — TELEPHONE ENCOUNTER
LOV: 7/26/18  Last Refilled:Never filled by you  Labs:AST 30    ALT 19  7/30/18      Future Appointments   Date Time Provider Guido Hoover   11/1/2018  2:30 PM Usman Solis MD [facility] [place]   1/30/2019 11:40 AM Augusta Valenzuela MD [facility]

## 2018-10-08 ENCOUNTER — LAB ENCOUNTER (OUTPATIENT)
Dept: LAB | Age: 73
End: 2018-10-08
Attending: FAMILY MEDICINE
Payer: MEDICARE

## 2018-10-08 DIAGNOSIS — E55.9 VITAMIN D DEFICIENCY: ICD-10-CM

## 2018-10-08 DIAGNOSIS — E03.9 HYPOTHYROIDISM, UNSPECIFIED TYPE: ICD-10-CM

## 2018-10-08 DIAGNOSIS — E78.2 MIXED HYPERLIPIDEMIA: ICD-10-CM

## 2018-10-08 PROCEDURE — 80048 BASIC METABOLIC PNL TOTAL CA: CPT

## 2018-10-08 PROCEDURE — 36415 COLL VENOUS BLD VENIPUNCTURE: CPT

## 2018-10-08 PROCEDURE — 84439 ASSAY OF FREE THYROXINE: CPT

## 2018-10-08 PROCEDURE — 80061 LIPID PANEL: CPT

## 2018-10-08 PROCEDURE — 84443 ASSAY THYROID STIM HORMONE: CPT

## 2018-10-08 PROCEDURE — 82306 VITAMIN D 25 HYDROXY: CPT

## 2018-10-11 ENCOUNTER — MED REC SCAN ONLY (OUTPATIENT)
Dept: RHEUMATOLOGY | Facility: CLINIC | Age: 73
End: 2018-10-11

## 2018-10-15 ENCOUNTER — TELEPHONE (OUTPATIENT)
Dept: OTHER | Age: 73
End: 2018-10-15

## 2018-10-15 NOTE — TELEPHONE ENCOUNTER
Regarding: Other  Contact: [phone]  ----- Message from Generic, Mychart sent at 10/14/2018 11:39 AM CDT -----    Dear Ivanna Agee  I have developed a rash on my face. This has been going on for about 5 weeks now.  They are small blisters and are red and t

## 2018-10-15 NOTE — TELEPHONE ENCOUNTER
Spoke with patient RE: MyChart message below--requesting appt today with LB to evaluate her face rash.     Patient is busy this morning, but can see LB today at 1 p.m. or later--no openings without [facility] approval.    Please advise    Please reply to bradley: MARTIN HUMMEL

## 2018-10-16 ENCOUNTER — OFFICE VISIT (OUTPATIENT)
Dept: FAMILY MEDICINE CLINIC | Facility: CLINIC | Age: 73
End: 2018-10-16

## 2018-10-16 VITALS
SYSTOLIC BLOOD PRESSURE: 125 MMHG | BODY MASS INDEX: 33 KG/M2 | HEART RATE: 64 BPM | WEIGHT: 155.81 LBS | DIASTOLIC BLOOD PRESSURE: 50 MMHG

## 2018-10-16 DIAGNOSIS — B95.8 STAPH SKIN INFECTION: Primary | ICD-10-CM

## 2018-10-16 DIAGNOSIS — L08.9 STAPH SKIN INFECTION: Primary | ICD-10-CM

## 2018-10-16 PROCEDURE — 99213 OFFICE O/P EST LOW 20 MIN: CPT | Performed by: FAMILY MEDICINE

## 2018-10-16 RX ORDER — DOXYCYCLINE HYCLATE 100 MG
100 TABLET ORAL 2 TIMES DAILY
Qty: 20 TABLET | Refills: 0 | Status: SHIPPED | OUTPATIENT
Start: 2018-10-16 | End: 2019-02-14 | Stop reason: ALTCHOICE

## 2018-10-16 NOTE — PROGRESS NOTES
Romayne Peace is a 68year old female. Patient presents with:  Derm Problem    HPI:   For 6 weeks, keeps breaking out in papular lesions on her face - reports it is painful and itches. Both sides of face.      Current Outpatient Medications on File Prior t Oral Tab Take by mouth daily. Disp:  Rfl:    Multiple Vitamin (MULTI-VITAMINS) Oral Tab Take  by mouth. Disp:  Rfl:      No current facility-administered medications on file prior to visit. Past Medical History:   Diagnosis Date   • Depression ?    • D Kurt Peguero MD  10/16/2018  11:47 AM

## 2018-10-17 NOTE — PROGRESS NOTES
Spoke with patient and advised Dr David Pina note and verbalized understanding.     Notes recorded by Karina Mcdonald MD on 10/17/2018 at 2:16 PM CDT  Danika Wolfe will keep it for now and repeat labs 2 months.  ------

## 2018-10-17 NOTE — PROGRESS NOTES
Labs are all normal except the thyroid levels are off again. What dose of medication - levothyroid is she taking?  It was normal on the 100 mcg for some time,

## 2018-10-31 RX ORDER — METHOTREXATE 2.5 MG/1
TABLET ORAL
Qty: 32 TABLET | Refills: 4 | Status: SHIPPED | OUTPATIENT
Start: 2018-10-31 | End: 2019-05-02

## 2018-10-31 NOTE — TELEPHONE ENCOUNTER
LOV:7-26  Last Filled:6-1, #32 with 4 refills  Labs:7-30, AST 30 and ALT 19  Future Appointments   Date Time Provider Guido Sneha   11/1/2018  2:30 PM Kodak Leo MD [facility] [place]   1/30/2019 11:40 AM Nicole Suh MD [facility]

## 2018-11-01 ENCOUNTER — OFFICE VISIT (OUTPATIENT)
Dept: RHEUMATOLOGY | Facility: CLINIC | Age: 73
End: 2018-11-01

## 2018-11-01 VITALS
WEIGHT: 153 LBS | BODY MASS INDEX: 32.12 KG/M2 | DIASTOLIC BLOOD PRESSURE: 78 MMHG | SYSTOLIC BLOOD PRESSURE: 150 MMHG | HEIGHT: 58 IN | HEART RATE: 70 BPM

## 2018-11-01 DIAGNOSIS — Z51.81 THERAPEUTIC DRUG MONITORING: ICD-10-CM

## 2018-11-01 DIAGNOSIS — M06.9 RHEUMATOID ARTHRITIS INVOLVING MULTIPLE SITES, UNSPECIFIED RHEUMATOID FACTOR PRESENCE: Primary | ICD-10-CM

## 2018-11-01 PROCEDURE — 99214 OFFICE O/P EST MOD 30 MIN: CPT | Performed by: INTERNAL MEDICINE

## 2018-11-01 RX ORDER — HYDROCODONE BITARTRATE AND ACETAMINOPHEN 10; 325 MG/1; MG/1
1 TABLET ORAL EVERY 12 HOURS PRN
Qty: 60 TABLET | Refills: 0 | Status: SHIPPED | OUTPATIENT
Start: 2018-12-01 | End: 2018-12-31

## 2018-11-01 RX ORDER — HYDROCODONE BITARTRATE AND ACETAMINOPHEN 10; 325 MG/1; MG/1
1 TABLET ORAL EVERY 12 HOURS PRN
Qty: 60 TABLET | Refills: 0 | Status: SHIPPED | OUTPATIENT
Start: 2018-11-01 | End: 2018-12-01

## 2018-11-01 RX ORDER — HYDROCODONE BITARTRATE AND ACETAMINOPHEN 10; 325 MG/1; MG/1
1 TABLET ORAL EVERY 12 HOURS PRN
Qty: 60 TABLET | Refills: 0 | Status: SHIPPED | OUTPATIENT
Start: 2019-01-01 | End: 2019-01-31

## 2018-11-01 NOTE — PROGRESS NOTES
Mariann Wang is a 68year old female. HPI:   Patient presents with:  Rheumatoid Arthritis  Knee Pain: right      I had the pleasure of seeing your patient Mrs. Amie Sandoval  On  7/26/2018. I had last seen her on 4/5/2018.  Tono cervantes seen her on 1/4/20 methotrexate 8 tablets a week and ssz 1500mg a day. She didn't feel a difference increase the ssz from 1000mg to 1500mg daily. She thought about the prolia - but she would like to try the fosamax again.  She thinks that if she is dehyrdated she was causin noticed she was still having pain. Then she was so bloated. She would take ssz 1500mg bid but she felt bloated and had heartburn. She had consitpation. So she went down to ssz 1000mg bid. Then weaned off and feels better with her stomach.    She's gained 30 for her right hip at [facility]. She's advanced to full weight . In rehab she was given percocet and it was stronger than norco. She had bad constiptiaon. She has 2/10 pain.    She restarted norco - now really only taking it once a day - not the twice a da • Unspecified essential hypertension       Social Hx Reviewed   Family Hx Reviewed     Medications (Active prior to today's visit):    Current Outpatient Medications:  BIOTIN OR Take by mouth daily. Disp:  Rfl:    TURMERIC OR Take by mouth daily.  Disp: Omega-3 Fatty Acids (FISH OIL OR) Take  by mouth. Take 1 cap every day Disp:  Rfl:    famotidine (PEPCID) 10 MG Oral Tab Take by mouth daily. Disp:  Rfl:    Multiple Vitamin (MULTI-VITAMINS) Oral Tab Take  by mouth.  Disp:  Rfl:    Doxycycline Hyclate 1 mg/dL 82   T4,Free (Direct)      0.58 - 1.64 ng/dL 1.10   TSH      0.45 - 5.33 uIU/mL 0.01 (L)   Vitamin D, 25OH, Total      30.0 - 100.0 ng/mL 72.7       B/l hand xrays 9/30/2015   1. Overall degenerative type arthritic changes bilaterally.   2. Considerab -0.8       Z SCORE:         0.9     PA LUMBAR SPINE (L1 - L4)               BMD:    1.087 gm/sq.  cm.            T SCORE:         0.4        Z SCORE:         2.6    ASSESSMENT/PLAN:     1. RA (rheumatoid arthritis) (HCC)  1. rheumatoid arthritis and ostoporosis. - she is declining prolia and reclast after reading about it. - d/w her about calcium with vitamin D     4. hypothryoidism - was on levothryoixine - changint the way she takes sit - will repeat in feb. -   5.  Lichen sclerosis - vaginal - b

## 2018-11-01 NOTE — PATIENT INSTRUCTIONS
1. Cont  Methotrexate 8 tablets a week and xeljanz 69AW ad ay and folic acid   2.. Check labs today . 3. lyrica 50mg at night. 4. norco 10/325mg twice a day as needed. #60 with 2 refills. 5. Return to clinic in 3 months. 6. Cont.  Calcium 1500mg a d

## 2018-11-07 NOTE — TELEPHONE ENCOUNTER
LOV: 11/1/18  Future Appointments   Date Time Provider Guido Hoover   1/30/2019 11:40 AM MD ROSS AlvaradoO     Please advise.

## 2018-11-14 ENCOUNTER — PATIENT MESSAGE (OUTPATIENT)
Dept: FAMILY MEDICINE CLINIC | Facility: CLINIC | Age: 73
End: 2018-11-14

## 2018-11-14 NOTE — TELEPHONE ENCOUNTER
From: Jayson Holt  To: Giuseppe Lennon MD  Sent: 11/14/2018 12:17 PM CST  Subject: Visit Follow-up Question    Dr. Davy Lock On Oct.16 I had a office visit with you regarding some sores on my face. You thought it might be a staff infection.  You prescriptio

## 2018-11-14 NOTE — TELEPHONE ENCOUNTER
Advised pt by phone that she should schedule an f/u appt. Can she be added to the 2:15 SDS slot tomorrow or should I try with the NP? Pt is anxious to have these sores looks at before next week. Per Dr. Curtis Cowan to add to [facility] slot. Pt contacted.   a

## 2018-11-15 ENCOUNTER — LAB ENCOUNTER (OUTPATIENT)
Dept: LAB | Age: 73
End: 2018-11-15
Attending: FAMILY MEDICINE
Payer: MEDICARE

## 2018-11-15 ENCOUNTER — OFFICE VISIT (OUTPATIENT)
Dept: FAMILY MEDICINE CLINIC | Facility: CLINIC | Age: 73
End: 2018-11-15

## 2018-11-15 VITALS
BODY MASS INDEX: 33 KG/M2 | HEART RATE: 62 BPM | DIASTOLIC BLOOD PRESSURE: 61 MMHG | WEIGHT: 156 LBS | SYSTOLIC BLOOD PRESSURE: 128 MMHG | TEMPERATURE: 98 F

## 2018-11-15 DIAGNOSIS — K13.79 MOUTH SORE: ICD-10-CM

## 2018-11-15 DIAGNOSIS — K13.79 MOUTH SORE: Primary | ICD-10-CM

## 2018-11-15 PROCEDURE — 86140 C-REACTIVE PROTEIN: CPT

## 2018-11-15 PROCEDURE — 85652 RBC SED RATE AUTOMATED: CPT

## 2018-11-15 PROCEDURE — 85025 COMPLETE CBC W/AUTO DIFF WBC: CPT

## 2018-11-15 PROCEDURE — 36415 COLL VENOUS BLD VENIPUNCTURE: CPT

## 2018-11-15 PROCEDURE — 99213 OFFICE O/P EST LOW 20 MIN: CPT | Performed by: FAMILY MEDICINE

## 2018-11-15 RX ORDER — PREDNISONE 20 MG/1
40 TABLET ORAL DAILY
Qty: 10 TABLET | Refills: 0 | Status: SHIPPED | OUTPATIENT
Start: 2018-11-15 | End: 2018-11-20

## 2018-11-15 NOTE — PROGRESS NOTES
Romayne Peace is a 68year old female. Patient presents with:  Derm Problem: Staph skin infection fup      HPI:   Reports symptoms got better with antibiotics. Getting more lesions and now in her mouth/lips.  Never had colonoscopy and does not want one per [employer] Disp: 90 tablet Rfl: 4   Zolpidem Tartrate 10 MG Oral Tab TAKE ONE TABLET BY MOUTH ONCE DAILY AT BEDTIME AS NEEDED Disp: 90 tablet Rfl: 4   alendronate 70 MG Oral Tab TAKE ONE TABLET BY MOUTH ONCE A WEEK Disp: 12 tablet Rfl: 3   Levothyr Alcohol/week: 0.0 oz      Comment: socially     Drug use: No       REVIEW OF SYSTEMS:   GENERAL HEALTH: feels well otherwise  SKIN: denies any unusual skin lesions or rashes  HEENT: denies eye complaints,denies sore throat, denies ear pain  RESPIRATORY: de

## 2018-11-18 NOTE — PROGRESS NOTES
[address] were all normal. No specific concerning inflammation in your body. - Dr. Gael Francois

## 2018-11-20 ENCOUNTER — PATIENT MESSAGE (OUTPATIENT)
Dept: FAMILY MEDICINE CLINIC | Facility: CLINIC | Age: 73
End: 2018-11-20

## 2018-11-20 NOTE — TELEPHONE ENCOUNTER
From: Roma Jiang  To: Asim Gorman MD  Sent: 11/20/2018 11:17 AM CST  Subject: Referral Request    Dear Dr. Mari Hendrix  On my recent appointment we discussed the concern that I have about needing to get a new primary care doctor.  Ulisses Spindle will not change ou

## 2018-11-27 PROBLEM — T84.052A: Status: ACTIVE | Noted: 2018-11-27

## 2018-11-30 ENCOUNTER — TELEPHONE (OUTPATIENT)
Dept: OBGYN CLINIC | Facility: CLINIC | Age: 73
End: 2018-11-30

## 2018-11-30 NOTE — TELEPHONE ENCOUNTER
AS OF January 1, 2019 WE WILL NO LONGER BE ACCEPTING PT'S INSURANCE. CALLED PT AND SHE IS AWARE OF THIS. SHE AND HER  RECEIVED A LETTER WITH THIS INFORMATION. I CANCELLED PT'S 1-30-19 APPT WITH JERAMIE.   SHE WANTED TO LET [facility] KNOW THAT SHE IS DOING W

## 2018-12-04 ENCOUNTER — OFFICE VISIT (OUTPATIENT)
Dept: OTOLARYNGOLOGY | Facility: CLINIC | Age: 73
End: 2018-12-04

## 2018-12-04 VITALS
DIASTOLIC BLOOD PRESSURE: 66 MMHG | HEIGHT: 58 IN | SYSTOLIC BLOOD PRESSURE: 144 MMHG | TEMPERATURE: 98 F | BODY MASS INDEX: 31.91 KG/M2 | WEIGHT: 152 LBS

## 2018-12-04 DIAGNOSIS — K13.70 ORAL LESION: Primary | ICD-10-CM

## 2018-12-04 PROCEDURE — G0463 HOSPITAL OUTPT CLINIC VISIT: HCPCS | Performed by: OTOLARYNGOLOGY

## 2018-12-04 PROCEDURE — 99203 OFFICE O/P NEW LOW 30 MIN: CPT | Performed by: OTOLARYNGOLOGY

## 2018-12-04 RX ORDER — ACYCLOVIR 50 MG/G
OINTMENT TOPICAL
Qty: 15 G | Refills: 0 | Status: SHIPPED | OUTPATIENT
Start: 2018-12-04 | End: 2019-02-14

## 2018-12-04 RX ORDER — FAMCICLOVIR 250 MG/1
250 TABLET, FILM COATED ORAL 2 TIMES DAILY
Qty: 20 TABLET | Refills: 0 | Status: SHIPPED | OUTPATIENT
Start: 2018-12-04 | End: 2018-12-14

## 2018-12-06 NOTE — PROGRESS NOTES
Nilson Keith is a 68year old female. Patient presents with:  Mouth Lesions: both sides of tongue and lower lip for 3 months     HPI:   For the last 3 months she has been experiencing a raw area along her lower lip.   In the last week she developed some p ONE TABLET BY MOUTH ONCE A WEEK Disp: 12 tablet Rfl: 3   Levothyroxine Sodium 100 MCG Oral Tab TAKE 1 TABLET (100 MCG TOTAL) BY MOUTH ONCE DAILY.  Disp: 90 tablet Rfl: 4   triamcinolone acetonide 0.1 % External Ointment Apply 1 Application topically 2 (two) fever, chills, sweats, weight loss, weight gain  SKIN: denies any unusual skin lesions or rashes  RESPIRATORY: denies shortness of breath with exertion  NEURO: denies headaches    EXAM:   /66   Temp 98.2 °F (36.8 °C) (Tympanic)   Ht 4' 10\" (1.473 m)

## 2019-01-08 ENCOUNTER — LAB ENCOUNTER (OUTPATIENT)
Dept: LAB | Age: 74
End: 2019-01-08
Attending: INTERNAL MEDICINE
Payer: MEDICARE

## 2019-01-08 DIAGNOSIS — M05.20 RHEUMATOID VASCULITIS (HCC): Primary | ICD-10-CM

## 2019-01-08 DIAGNOSIS — Z96.649 PAIN DUE TO HIP JOINT PROSTHESIS, SEQUELA: ICD-10-CM

## 2019-01-08 DIAGNOSIS — E03.9 HYPOTHYROIDISM, ADULT: ICD-10-CM

## 2019-01-08 DIAGNOSIS — T84.84XS PAIN DUE TO HIP JOINT PROSTHESIS, SEQUELA: ICD-10-CM

## 2019-01-08 LAB
ALBUMIN SERPL BCP-MCNC: 3.9 G/DL (ref 3.5–4.8)
ALBUMIN/GLOB SERPL: 1.8 {RATIO} (ref 1–2)
ALP SERPL-CCNC: 46 U/L (ref 32–100)
ALT SERPL-CCNC: 16 U/L (ref 14–54)
ANION GAP SERPL CALC-SCNC: 10 MMOL/L (ref 0–18)
AST SERPL-CCNC: 27 U/L (ref 15–41)
BASOPHILS # BLD: 0 K/UL (ref 0–0.2)
BASOPHILS NFR BLD: 0 %
BILIRUB SERPL-MCNC: 0.5 MG/DL (ref 0.3–1.2)
BILIRUB UR QL: NEGATIVE
BUN SERPL-MCNC: 14 MG/DL (ref 8–20)
BUN/CREAT SERPL: 17.5 (ref 10–20)
CALCIUM SERPL-MCNC: 10 MG/DL (ref 8.5–10.5)
CHLORIDE SERPL-SCNC: 104 MMOL/L (ref 95–110)
CHOLEST SERPL-MCNC: 197 MG/DL (ref 110–200)
CLARITY UR: CLEAR
CO2 SERPL-SCNC: 26 MMOL/L (ref 22–32)
COLOR UR: YELLOW
CREAT SERPL-MCNC: 0.8 MG/DL (ref 0.5–1.5)
EOSINOPHIL # BLD: 0.1 K/UL (ref 0–0.7)
EOSINOPHIL NFR BLD: 3 %
ERYTHROCYTE [DISTWIDTH] IN BLOOD BY AUTOMATED COUNT: 15.2 % (ref 11–15)
GLOBULIN PLAS-MCNC: 2.2 G/DL (ref 2.5–3.7)
GLUCOSE SERPL-MCNC: 86 MG/DL (ref 70–99)
GLUCOSE UR-MCNC: NEGATIVE MG/DL
HCT VFR BLD AUTO: 34.6 % (ref 35–48)
HDLC SERPL-MCNC: 60 MG/DL
HGB BLD-MCNC: 11.8 G/DL (ref 12–16)
HGB UR QL STRIP.AUTO: NEGATIVE
KETONES UR-MCNC: NEGATIVE MG/DL
LDLC SERPL CALC-MCNC: 107 MG/DL (ref 0–99)
LEUKOCYTE ESTERASE UR QL STRIP.AUTO: NEGATIVE
LYMPHOCYTES # BLD: 0.8 K/UL (ref 1–4)
LYMPHOCYTES NFR BLD: 16 %
MCH RBC QN AUTO: 34 PG (ref 27–32)
MCHC RBC AUTO-ENTMCNC: 34.2 G/DL (ref 32–37)
MCV RBC AUTO: 99.3 FL (ref 80–100)
MONOCYTES # BLD: 0.6 K/UL (ref 0–1)
MONOCYTES NFR BLD: 12 %
NEUTROPHILS # BLD AUTO: 3.6 K/UL (ref 1.8–7.7)
NEUTROPHILS NFR BLD: 69 %
NITRITE UR QL STRIP.AUTO: NEGATIVE
NONHDLC SERPL-MCNC: 137 MG/DL
OSMOLALITY UR CALC.SUM OF ELEC: 290 MOSM/KG (ref 275–295)
PATIENT FASTING: YES
PH UR: 7 [PH] (ref 5–8)
PLATELET # BLD AUTO: 203 K/UL (ref 140–400)
PMV BLD AUTO: 9.4 FL (ref 7.4–10.3)
POTASSIUM SERPL-SCNC: 4.4 MMOL/L (ref 3.3–5.1)
PROT SERPL-MCNC: 6.1 G/DL (ref 5.9–8.4)
PROT UR-MCNC: NEGATIVE MG/DL
RBC # BLD AUTO: 3.48 M/UL (ref 3.7–5.4)
SODIUM SERPL-SCNC: 140 MMOL/L (ref 136–144)
SP GR UR STRIP: 1.01 (ref 1–1.03)
T4 FREE SERPL-MCNC: 1.08 NG/DL (ref 0.58–1.64)
TRIGL SERPL-MCNC: 151 MG/DL (ref 1–149)
TSH SERPL-ACNC: 0.07 UIU/ML (ref 0.45–5.33)
UROBILINOGEN UR STRIP-ACNC: <2
VIT C UR-MCNC: NEGATIVE MG/DL
WBC # BLD AUTO: 5.2 K/UL (ref 4–11)

## 2019-01-08 PROCEDURE — 80061 LIPID PANEL: CPT

## 2019-01-08 PROCEDURE — 81003 URINALYSIS AUTO W/O SCOPE: CPT

## 2019-01-08 PROCEDURE — 80053 COMPREHEN METABOLIC PANEL: CPT

## 2019-01-08 PROCEDURE — 85025 COMPLETE CBC W/AUTO DIFF WBC: CPT

## 2019-01-08 PROCEDURE — 36415 COLL VENOUS BLD VENIPUNCTURE: CPT

## 2019-01-08 PROCEDURE — 84439 ASSAY OF FREE THYROXINE: CPT

## 2019-01-08 PROCEDURE — 84443 ASSAY THYROID STIM HORMONE: CPT

## 2019-02-15 ENCOUNTER — LAB ENCOUNTER (OUTPATIENT)
Dept: LAB | Age: 74
End: 2019-02-15
Attending: ORTHOPAEDIC SURGERY
Payer: MEDICARE

## 2019-02-15 DIAGNOSIS — Z01.818 PREOP TESTING: ICD-10-CM

## 2019-02-15 LAB
ANTIBODY SCREEN: NEGATIVE
APTT PPP: 29.7 SECONDS (ref 23.2–35.3)
RH BLOOD TYPE: POSITIVE

## 2019-02-15 PROCEDURE — 86900 BLOOD TYPING SEROLOGIC ABO: CPT

## 2019-02-15 PROCEDURE — 85730 THROMBOPLASTIN TIME PARTIAL: CPT

## 2019-02-15 PROCEDURE — 86850 RBC ANTIBODY SCREEN: CPT

## 2019-02-15 PROCEDURE — 86901 BLOOD TYPING SEROLOGIC RH(D): CPT

## 2019-02-15 PROCEDURE — 36415 COLL VENOUS BLD VENIPUNCTURE: CPT

## 2019-03-14 PROCEDURE — 81003 URINALYSIS AUTO W/O SCOPE: CPT | Performed by: FAMILY MEDICINE

## 2019-03-18 RX ORDER — TOFACITINIB 11 MG/1
TABLET, FILM COATED, EXTENDED RELEASE ORAL
Qty: 60 TABLET | Refills: 2 | Status: SHIPPED | OUTPATIENT
Start: 2019-03-18 | End: 2019-05-02

## 2019-03-18 NOTE — TELEPHONE ENCOUNTER
LOV 11/1/18. RTC 3 months. Not on current med list. Last filled 10/1/18 x 6 month supply. No F/U scheduled. Pt is due for routine follow up/labs and need to know if current taking this medication. Per LOV note:    1. Cont  Methotrexate 8 tablets a week and xeljanz 04JY ad ay and folic acid   2.. Check labs next month. LMTCB.

## 2019-03-19 NOTE — TELEPHONE ENCOUNTER
Phoned patient and she has one month left of [facility]. Her insurance plan has changed and she will be seeing another RHE. The appointment is not until May. Please advise.

## 2019-03-28 RX ORDER — CEFAZOLIN SODIUM/WATER 2 G/20 ML
2 SYRINGE (ML) INTRAVENOUS ONCE
Status: DISCONTINUED | OUTPATIENT
Start: 2019-03-28 | End: 2019-03-28

## 2019-03-28 RX ORDER — SODIUM CHLORIDE, SODIUM LACTATE, POTASSIUM CHLORIDE, CALCIUM CHLORIDE 600; 310; 30; 20 MG/100ML; MG/100ML; MG/100ML; MG/100ML
INJECTION, SOLUTION INTRAVENOUS CONTINUOUS
Status: DISCONTINUED | OUTPATIENT
Start: 2019-03-28 | End: 2019-03-28

## 2019-03-28 RX ORDER — FAMOTIDINE 20 MG/1
20 TABLET ORAL ONCE
Status: DISCONTINUED | OUTPATIENT
Start: 2019-03-28 | End: 2019-03-28

## 2019-03-28 RX ORDER — PHENOL 1.4 %
AEROSOL, SPRAY (ML) MUCOUS MEMBRANE NIGHTLY
COMMUNITY
End: 2020-10-06 | Stop reason: ALTCHOICE

## 2019-03-28 RX ORDER — ACETAMINOPHEN 500 MG
1000 TABLET ORAL ONCE
Status: DISCONTINUED | OUTPATIENT
Start: 2019-03-28 | End: 2019-03-28

## 2019-03-28 RX ORDER — METOCLOPRAMIDE 10 MG/1
10 TABLET ORAL ONCE
Status: DISCONTINUED | OUTPATIENT
Start: 2019-03-28 | End: 2019-03-28

## 2019-03-29 ENCOUNTER — LAB ENCOUNTER (OUTPATIENT)
Dept: LAB | Age: 74
End: 2019-03-29
Attending: ORTHOPAEDIC SURGERY
Payer: MEDICARE

## 2019-03-29 DIAGNOSIS — Z01.818 PREOP TESTING: ICD-10-CM

## 2019-03-29 LAB
ANTIBODY SCREEN: NEGATIVE
RH BLOOD TYPE: POSITIVE

## 2019-03-29 PROCEDURE — 86920 COMPATIBILITY TEST SPIN: CPT

## 2019-03-29 PROCEDURE — 36415 COLL VENOUS BLD VENIPUNCTURE: CPT

## 2019-03-29 PROCEDURE — 86901 BLOOD TYPING SEROLOGIC RH(D): CPT

## 2019-03-29 PROCEDURE — 86900 BLOOD TYPING SEROLOGIC ABO: CPT

## 2019-03-29 PROCEDURE — 86850 RBC ANTIBODY SCREEN: CPT

## 2019-04-02 PROBLEM — E66.01 SEVERE OBESITY (BMI 35.0-39.9) WITH COMORBIDITY (HCC): Chronic | Status: RESOLVED | Noted: 2017-09-28 | Resolved: 2019-04-02

## 2019-04-10 ENCOUNTER — HOSPITAL ENCOUNTER (INPATIENT)
Facility: HOSPITAL | Age: 74
LOS: 2 days | Discharge: HOME HEALTH | DRG: 468 | End: 2019-04-12
Attending: ORTHOPAEDIC SURGERY | Admitting: ORTHOPAEDIC SURGERY
Payer: MEDICARE

## 2019-04-10 ENCOUNTER — APPOINTMENT (OUTPATIENT)
Dept: GENERAL RADIOLOGY | Facility: HOSPITAL | Age: 74
DRG: 468 | End: 2019-04-10
Attending: ORTHOPAEDIC SURGERY
Payer: MEDICARE

## 2019-04-10 ENCOUNTER — ANESTHESIA (OUTPATIENT)
Dept: SURGERY | Facility: HOSPITAL | Age: 74
DRG: 468 | End: 2019-04-10
Payer: MEDICARE

## 2019-04-10 ENCOUNTER — ANESTHESIA EVENT (OUTPATIENT)
Dept: SURGERY | Facility: HOSPITAL | Age: 74
DRG: 468 | End: 2019-04-10
Payer: MEDICARE

## 2019-04-10 DIAGNOSIS — Z01.818 PREOP TESTING: Primary | ICD-10-CM

## 2019-04-10 DIAGNOSIS — Z96.651 PAIN DUE TO TOTAL RIGHT KNEE REPLACEMENT, SUBSEQUENT ENCOUNTER: ICD-10-CM

## 2019-04-10 DIAGNOSIS — T84.84XD PAIN DUE TO TOTAL RIGHT KNEE REPLACEMENT, SUBSEQUENT ENCOUNTER: ICD-10-CM

## 2019-04-10 PROCEDURE — 0SPV0JZ REMOVAL OF SYNTHETIC SUBSTITUTE FROM RIGHT KNEE JOINT, TIBIAL SURFACE, OPEN APPROACH: ICD-10-PCS | Performed by: ORTHOPAEDIC SURGERY

## 2019-04-10 PROCEDURE — 0SRT0J9 REPLACEMENT OF RIGHT KNEE JOINT, FEMORAL SURFACE WITH SYNTHETIC SUBSTITUTE, CEMENTED, OPEN APPROACH: ICD-10-PCS | Performed by: ORTHOPAEDIC SURGERY

## 2019-04-10 PROCEDURE — 88311 DECALCIFY TISSUE: CPT | Performed by: ORTHOPAEDIC SURGERY

## 2019-04-10 PROCEDURE — 3E0T3BZ INTRODUCTION OF ANESTHETIC AGENT INTO PERIPHERAL NERVES AND PLEXI, PERCUTANEOUS APPROACH: ICD-10-PCS | Performed by: ANESTHESIOLOGY

## 2019-04-10 PROCEDURE — 73560 X-RAY EXAM OF KNEE 1 OR 2: CPT | Performed by: ORTHOPAEDIC SURGERY

## 2019-04-10 PROCEDURE — 88305 TISSUE EXAM BY PATHOLOGIST: CPT | Performed by: ORTHOPAEDIC SURGERY

## 2019-04-10 PROCEDURE — 0SRV0J9 REPLACEMENT OF RIGHT KNEE JOINT, TIBIAL SURFACE WITH SYNTHETIC SUBSTITUTE, CEMENTED, OPEN APPROACH: ICD-10-PCS | Performed by: ORTHOPAEDIC SURGERY

## 2019-04-10 PROCEDURE — 0SPT0JZ REMOVAL OF SYNTHETIC SUBSTITUTE FROM RIGHT KNEE JOINT, FEMORAL SURFACE, OPEN APPROACH: ICD-10-PCS | Performed by: ORTHOPAEDIC SURGERY

## 2019-04-10 PROCEDURE — 99152 MOD SED SAME PHYS/QHP 5/>YRS: CPT | Performed by: ORTHOPAEDIC SURGERY

## 2019-04-10 PROCEDURE — 64447 NJX AA&/STRD FEMORAL NRV IMG: CPT | Performed by: ORTHOPAEDIC SURGERY

## 2019-04-10 PROCEDURE — 76942 ECHO GUIDE FOR BIOPSY: CPT | Performed by: ORTHOPAEDIC SURGERY

## 2019-04-10 PROCEDURE — 88300 SURGICAL PATH GROSS: CPT | Performed by: ORTHOPAEDIC SURGERY

## 2019-04-10 DEVICE — KIT FEM BONE CEMENT RESTRICTOR: Type: IMPLANTABLE DEVICE | Status: FUNCTIONAL

## 2019-04-10 DEVICE — SMARTSET HIGH PERFORMANCE MV MEDIUM VISCOSITY BONE CEMENT 40G
Type: IMPLANTABLE DEVICE | Status: FUNCTIONAL
Brand: SMARTSET

## 2019-04-10 RX ORDER — HYDROCODONE BITARTRATE AND ACETAMINOPHEN 5; 325 MG/1; MG/1
1 TABLET ORAL AS NEEDED
Status: DISCONTINUED | OUTPATIENT
Start: 2019-04-10 | End: 2019-04-10 | Stop reason: HOSPADM

## 2019-04-10 RX ORDER — SCOLOPAMINE TRANSDERMAL SYSTEM 1 MG/1
1 PATCH, EXTENDED RELEASE TRANSDERMAL ONCE
Status: DISCONTINUED | OUTPATIENT
Start: 2019-04-10 | End: 2019-04-12

## 2019-04-10 RX ORDER — OXYCODONE HCL 10 MG/1
10 TABLET, FILM COATED, EXTENDED RELEASE ORAL ONCE
Status: COMPLETED | OUTPATIENT
Start: 2019-04-10 | End: 2019-04-10

## 2019-04-10 RX ORDER — GABAPENTIN 600 MG/1
600 TABLET ORAL ONCE
Status: DISCONTINUED | OUTPATIENT
Start: 2019-04-10 | End: 2019-04-10

## 2019-04-10 RX ORDER — GABAPENTIN 600 MG/1
600 TABLET ORAL ONCE
Status: COMPLETED | OUTPATIENT
Start: 2019-04-10 | End: 2019-04-10

## 2019-04-10 RX ORDER — MORPHINE SULFATE 4 MG/ML
4 INJECTION, SOLUTION INTRAMUSCULAR; INTRAVENOUS EVERY 10 MIN PRN
Status: DISCONTINUED | OUTPATIENT
Start: 2019-04-10 | End: 2019-04-10 | Stop reason: HOSPADM

## 2019-04-10 RX ORDER — GLYCOPYRROLATE 0.2 MG/ML
INJECTION INTRAMUSCULAR; INTRAVENOUS AS NEEDED
Status: DISCONTINUED | OUTPATIENT
Start: 2019-04-10 | End: 2019-04-10 | Stop reason: SURG

## 2019-04-10 RX ORDER — ONDANSETRON 2 MG/ML
4 INJECTION INTRAMUSCULAR; INTRAVENOUS ONCE AS NEEDED
Status: DISCONTINUED | OUTPATIENT
Start: 2019-04-10 | End: 2019-04-10 | Stop reason: HOSPADM

## 2019-04-10 RX ORDER — ROCURONIUM BROMIDE 10 MG/ML
INJECTION, SOLUTION INTRAVENOUS AS NEEDED
Status: DISCONTINUED | OUTPATIENT
Start: 2019-04-10 | End: 2019-04-10 | Stop reason: SURG

## 2019-04-10 RX ORDER — PRAVASTATIN SODIUM 20 MG
10 TABLET ORAL NIGHTLY
Status: DISCONTINUED | OUTPATIENT
Start: 2019-04-10 | End: 2019-04-12

## 2019-04-10 RX ORDER — OXYCODONE HCL 10 MG/1
10 TABLET, FILM COATED, EXTENDED RELEASE ORAL ONCE
Status: DISCONTINUED | OUTPATIENT
Start: 2019-04-10 | End: 2019-04-10

## 2019-04-10 RX ORDER — MORPHINE SULFATE 2 MG/ML
2 INJECTION, SOLUTION INTRAMUSCULAR; INTRAVENOUS EVERY 10 MIN PRN
Status: DISCONTINUED | OUTPATIENT
Start: 2019-04-10 | End: 2019-04-10 | Stop reason: HOSPADM

## 2019-04-10 RX ORDER — DIPHENHYDRAMINE HYDROCHLORIDE 50 MG/ML
25 INJECTION INTRAMUSCULAR; INTRAVENOUS ONCE AS NEEDED
Status: DISCONTINUED | OUTPATIENT
Start: 2019-04-10 | End: 2019-04-10 | Stop reason: HOSPADM

## 2019-04-10 RX ORDER — HYDROCODONE BITARTRATE AND ACETAMINOPHEN 7.5; 325 MG/1; MG/1
2 TABLET ORAL EVERY 4 HOURS PRN
Status: DISCONTINUED | OUTPATIENT
Start: 2019-04-10 | End: 2019-04-10 | Stop reason: HOSPADM

## 2019-04-10 RX ORDER — LEVOTHYROXINE SODIUM 0.1 MG/1
100 TABLET ORAL
Status: DISCONTINUED | OUTPATIENT
Start: 2019-04-11 | End: 2019-04-12

## 2019-04-10 RX ORDER — HYDROCODONE BITARTRATE AND ACETAMINOPHEN 7.5; 325 MG/1; MG/1
1 TABLET ORAL EVERY 4 HOURS PRN
Status: DISCONTINUED | OUTPATIENT
Start: 2019-04-10 | End: 2019-04-10 | Stop reason: HOSPADM

## 2019-04-10 RX ORDER — SODIUM CHLORIDE, SODIUM LACTATE, POTASSIUM CHLORIDE, CALCIUM CHLORIDE 600; 310; 30; 20 MG/100ML; MG/100ML; MG/100ML; MG/100ML
INJECTION, SOLUTION INTRAVENOUS CONTINUOUS
Status: DISCONTINUED | OUTPATIENT
Start: 2019-04-10 | End: 2019-04-10 | Stop reason: HOSPADM

## 2019-04-10 RX ORDER — ASPIRIN 325 MG
325 TABLET ORAL 2 TIMES DAILY
Status: DISCONTINUED | OUTPATIENT
Start: 2019-04-10 | End: 2019-04-10 | Stop reason: HOSPADM

## 2019-04-10 RX ORDER — CEFAZOLIN SODIUM/WATER 2 G/20 ML
2 SYRINGE (ML) INTRAVENOUS EVERY 8 HOURS
Status: COMPLETED | OUTPATIENT
Start: 2019-04-10 | End: 2019-04-11

## 2019-04-10 RX ORDER — HYDRALAZINE HYDROCHLORIDE 20 MG/ML
INJECTION INTRAMUSCULAR; INTRAVENOUS AS NEEDED
Status: DISCONTINUED | OUTPATIENT
Start: 2019-04-10 | End: 2019-04-10 | Stop reason: SURG

## 2019-04-10 RX ORDER — ACETAMINOPHEN 500 MG
1000 TABLET ORAL ONCE
Status: DISCONTINUED | OUTPATIENT
Start: 2019-04-10 | End: 2019-04-10

## 2019-04-10 RX ORDER — ASPIRIN 325 MG
325 TABLET ORAL 2 TIMES DAILY
Status: DISCONTINUED | OUTPATIENT
Start: 2019-04-10 | End: 2019-04-12

## 2019-04-10 RX ORDER — SODIUM CHLORIDE, SODIUM LACTATE, POTASSIUM CHLORIDE, CALCIUM CHLORIDE 600; 310; 30; 20 MG/100ML; MG/100ML; MG/100ML; MG/100ML
INJECTION, SOLUTION INTRAVENOUS CONTINUOUS
Status: DISCONTINUED | OUTPATIENT
Start: 2019-04-10 | End: 2019-04-10 | Stop reason: ALTCHOICE

## 2019-04-10 RX ORDER — ACETAMINOPHEN 325 MG/1
650 TABLET ORAL EVERY 4 HOURS PRN
Status: DISCONTINUED | OUTPATIENT
Start: 2019-04-10 | End: 2019-04-10 | Stop reason: HOSPADM

## 2019-04-10 RX ORDER — CEFAZOLIN SODIUM/WATER 2 G/20 ML
SYRINGE (ML) INTRAVENOUS AS NEEDED
Status: DISCONTINUED | OUTPATIENT
Start: 2019-04-10 | End: 2019-04-10 | Stop reason: SURG

## 2019-04-10 RX ORDER — MORPHINE SULFATE 10 MG/ML
6 INJECTION, SOLUTION INTRAMUSCULAR; INTRAVENOUS EVERY 10 MIN PRN
Status: DISCONTINUED | OUTPATIENT
Start: 2019-04-10 | End: 2019-04-10 | Stop reason: HOSPADM

## 2019-04-10 RX ORDER — ACETAMINOPHEN 500 MG
1000 TABLET ORAL ONCE
Status: DISCONTINUED | OUTPATIENT
Start: 2019-04-10 | End: 2019-04-10 | Stop reason: HOSPADM

## 2019-04-10 RX ORDER — HYDROMORPHONE HYDROCHLORIDE 1 MG/ML
0.8 INJECTION, SOLUTION INTRAMUSCULAR; INTRAVENOUS; SUBCUTANEOUS EVERY 2 HOUR PRN
Status: DISCONTINUED | OUTPATIENT
Start: 2019-04-10 | End: 2019-04-12

## 2019-04-10 RX ORDER — HALOPERIDOL 5 MG/ML
0.25 INJECTION INTRAMUSCULAR ONCE AS NEEDED
Status: DISCONTINUED | OUTPATIENT
Start: 2019-04-10 | End: 2019-04-10 | Stop reason: HOSPADM

## 2019-04-10 RX ORDER — ONDANSETRON 2 MG/ML
INJECTION INTRAMUSCULAR; INTRAVENOUS AS NEEDED
Status: DISCONTINUED | OUTPATIENT
Start: 2019-04-10 | End: 2019-04-10 | Stop reason: SURG

## 2019-04-10 RX ORDER — HYDROMORPHONE HYDROCHLORIDE 1 MG/ML
0.4 INJECTION, SOLUTION INTRAMUSCULAR; INTRAVENOUS; SUBCUTANEOUS EVERY 2 HOUR PRN
Status: DISCONTINUED | OUTPATIENT
Start: 2019-04-10 | End: 2019-04-10 | Stop reason: HOSPADM

## 2019-04-10 RX ORDER — ONDANSETRON 2 MG/ML
4 INJECTION INTRAMUSCULAR; INTRAVENOUS EVERY 4 HOURS PRN
Status: DISCONTINUED | OUTPATIENT
Start: 2019-04-10 | End: 2019-04-10 | Stop reason: HOSPADM

## 2019-04-10 RX ORDER — FOLIC ACID 1 MG/1
1 TABLET ORAL DAILY
Status: DISCONTINUED | OUTPATIENT
Start: 2019-04-10 | End: 2019-04-12

## 2019-04-10 RX ORDER — SODIUM CHLORIDE 0.9 % (FLUSH) 0.9 %
10 SYRINGE (ML) INJECTION AS NEEDED
Status: DISCONTINUED | OUTPATIENT
Start: 2019-04-10 | End: 2019-04-10 | Stop reason: HOSPADM

## 2019-04-10 RX ORDER — HYDROCODONE BITARTRATE AND ACETAMINOPHEN 7.5; 325 MG/1; MG/1
1 TABLET ORAL EVERY 4 HOURS PRN
Status: DISCONTINUED | OUTPATIENT
Start: 2019-04-10 | End: 2019-04-12

## 2019-04-10 RX ORDER — FAMOTIDINE 20 MG/1
20 TABLET ORAL ONCE
Status: COMPLETED | OUTPATIENT
Start: 2019-04-10 | End: 2019-04-10

## 2019-04-10 RX ORDER — SODIUM CHLORIDE 9 MG/ML
INJECTION, SOLUTION INTRAVENOUS CONTINUOUS
Status: DISCONTINUED | OUTPATIENT
Start: 2019-04-10 | End: 2019-04-12

## 2019-04-10 RX ORDER — MAGNESIUM HYDROXIDE 1200 MG/15ML
LIQUID ORAL CONTINUOUS PRN
Status: COMPLETED | OUTPATIENT
Start: 2019-04-10 | End: 2019-04-10

## 2019-04-10 RX ORDER — HYDROMORPHONE HYDROCHLORIDE 1 MG/ML
0.4 INJECTION, SOLUTION INTRAMUSCULAR; INTRAVENOUS; SUBCUTANEOUS EVERY 2 HOUR PRN
Status: DISCONTINUED | OUTPATIENT
Start: 2019-04-10 | End: 2019-04-12

## 2019-04-10 RX ORDER — SODIUM CHLORIDE 0.9 % (FLUSH) 0.9 %
10 SYRINGE (ML) INJECTION AS NEEDED
Status: DISCONTINUED | OUTPATIENT
Start: 2019-04-10 | End: 2019-04-12

## 2019-04-10 RX ORDER — DIPHENHYDRAMINE HYDROCHLORIDE 50 MG/ML
25 INJECTION INTRAMUSCULAR; INTRAVENOUS ONCE AS NEEDED
Status: ACTIVE | OUTPATIENT
Start: 2019-04-10 | End: 2019-04-10

## 2019-04-10 RX ORDER — ONDANSETRON 2 MG/ML
4 INJECTION INTRAMUSCULAR; INTRAVENOUS EVERY 4 HOURS PRN
Status: DISCONTINUED | OUTPATIENT
Start: 2019-04-10 | End: 2019-04-12

## 2019-04-10 RX ORDER — HYDROCODONE BITARTRATE AND ACETAMINOPHEN 5; 325 MG/1; MG/1
2 TABLET ORAL AS NEEDED
Status: DISCONTINUED | OUTPATIENT
Start: 2019-04-10 | End: 2019-04-10 | Stop reason: HOSPADM

## 2019-04-10 RX ORDER — CEFAZOLIN SODIUM/WATER 2 G/20 ML
2 SYRINGE (ML) INTRAVENOUS ONCE
Status: DISCONTINUED | OUTPATIENT
Start: 2019-04-10 | End: 2019-04-10 | Stop reason: HOSPADM

## 2019-04-10 RX ORDER — ROPIVACAINE HYDROCHLORIDE 5 MG/ML
INJECTION, SOLUTION EPIDURAL; INFILTRATION; PERINEURAL
Status: COMPLETED | OUTPATIENT
Start: 2019-04-10 | End: 2019-04-10

## 2019-04-10 RX ORDER — FAMOTIDINE 20 MG/1
20 TABLET ORAL ONCE
Status: DISCONTINUED | OUTPATIENT
Start: 2019-04-10 | End: 2019-04-10

## 2019-04-10 RX ORDER — DEXAMETHASONE SODIUM PHOSPHATE 4 MG/ML
VIAL (ML) INJECTION AS NEEDED
Status: DISCONTINUED | OUTPATIENT
Start: 2019-04-10 | End: 2019-04-10 | Stop reason: SURG

## 2019-04-10 RX ORDER — KETOROLAC TROMETHAMINE 15 MG/ML
15 INJECTION, SOLUTION INTRAMUSCULAR; INTRAVENOUS EVERY 6 HOURS PRN
Status: DISCONTINUED | OUTPATIENT
Start: 2019-04-10 | End: 2019-04-12

## 2019-04-10 RX ORDER — NEOSTIGMINE METHYLSULFATE 0.5 MG/ML
INJECTION INTRAVENOUS AS NEEDED
Status: DISCONTINUED | OUTPATIENT
Start: 2019-04-10 | End: 2019-04-10 | Stop reason: SURG

## 2019-04-10 RX ORDER — KETOROLAC TROMETHAMINE 15 MG/ML
15 INJECTION, SOLUTION INTRAMUSCULAR; INTRAVENOUS EVERY 6 HOURS PRN
Status: DISCONTINUED | OUTPATIENT
Start: 2019-04-10 | End: 2019-04-10

## 2019-04-10 RX ORDER — HYDROCODONE BITARTRATE AND ACETAMINOPHEN 7.5; 325 MG/1; MG/1
2 TABLET ORAL EVERY 4 HOURS PRN
Status: DISCONTINUED | OUTPATIENT
Start: 2019-04-10 | End: 2019-04-12

## 2019-04-10 RX ORDER — ACETAMINOPHEN 325 MG/1
650 TABLET ORAL EVERY 4 HOURS PRN
Status: DISCONTINUED | OUTPATIENT
Start: 2019-04-10 | End: 2019-04-12

## 2019-04-10 RX ORDER — METOCLOPRAMIDE HYDROCHLORIDE 5 MG/ML
10 INJECTION INTRAMUSCULAR; INTRAVENOUS EVERY 6 HOURS PRN
Status: DISCONTINUED | OUTPATIENT
Start: 2019-04-10 | End: 2019-04-12

## 2019-04-10 RX ORDER — CELECOXIB 200 MG/1
200 CAPSULE ORAL ONCE
Status: COMPLETED | OUTPATIENT
Start: 2019-04-10 | End: 2019-04-10

## 2019-04-10 RX ORDER — HYDROMORPHONE HYDROCHLORIDE 1 MG/ML
1.2 INJECTION, SOLUTION INTRAMUSCULAR; INTRAVENOUS; SUBCUTANEOUS EVERY 2 HOUR PRN
Status: DISCONTINUED | OUTPATIENT
Start: 2019-04-10 | End: 2019-04-12

## 2019-04-10 RX ORDER — CITALOPRAM 20 MG/1
20 TABLET ORAL DAILY
Status: DISCONTINUED | OUTPATIENT
Start: 2019-04-11 | End: 2019-04-12

## 2019-04-10 RX ORDER — SODIUM CHLORIDE, SODIUM LACTATE, POTASSIUM CHLORIDE, CALCIUM CHLORIDE 600; 310; 30; 20 MG/100ML; MG/100ML; MG/100ML; MG/100ML
INJECTION, SOLUTION INTRAVENOUS CONTINUOUS
Status: DISCONTINUED | OUTPATIENT
Start: 2019-04-10 | End: 2019-04-12

## 2019-04-10 RX ORDER — HYDROMORPHONE HYDROCHLORIDE 1 MG/ML
0.8 INJECTION, SOLUTION INTRAMUSCULAR; INTRAVENOUS; SUBCUTANEOUS EVERY 2 HOUR PRN
Status: DISCONTINUED | OUTPATIENT
Start: 2019-04-10 | End: 2019-04-10 | Stop reason: HOSPADM

## 2019-04-10 RX ORDER — CELECOXIB 200 MG/1
200 CAPSULE ORAL ONCE
Status: DISCONTINUED | OUTPATIENT
Start: 2019-04-10 | End: 2019-04-10

## 2019-04-10 RX ORDER — HYDROMORPHONE HYDROCHLORIDE 1 MG/ML
1.2 INJECTION, SOLUTION INTRAMUSCULAR; INTRAVENOUS; SUBCUTANEOUS EVERY 2 HOUR PRN
Status: DISCONTINUED | OUTPATIENT
Start: 2019-04-10 | End: 2019-04-10 | Stop reason: HOSPADM

## 2019-04-10 RX ORDER — NALOXONE HYDROCHLORIDE 0.4 MG/ML
80 INJECTION, SOLUTION INTRAMUSCULAR; INTRAVENOUS; SUBCUTANEOUS AS NEEDED
Status: DISCONTINUED | OUTPATIENT
Start: 2019-04-10 | End: 2019-04-10 | Stop reason: HOSPADM

## 2019-04-10 RX ORDER — LIDOCAINE HYDROCHLORIDE 10 MG/ML
INJECTION, SOLUTION EPIDURAL; INFILTRATION; INTRACAUDAL; PERINEURAL AS NEEDED
Status: DISCONTINUED | OUTPATIENT
Start: 2019-04-10 | End: 2019-04-10 | Stop reason: SURG

## 2019-04-10 RX ORDER — ACETAMINOPHEN 500 MG
1000 TABLET ORAL ONCE
Status: COMPLETED | OUTPATIENT
Start: 2019-04-10 | End: 2019-04-10

## 2019-04-10 RX ORDER — PREGABALIN 50 MG/1
50 CAPSULE ORAL NIGHTLY
Status: DISCONTINUED | OUTPATIENT
Start: 2019-04-10 | End: 2019-04-12

## 2019-04-10 RX ORDER — METOCLOPRAMIDE HYDROCHLORIDE 5 MG/ML
10 INJECTION INTRAMUSCULAR; INTRAVENOUS EVERY 6 HOURS PRN
Status: DISCONTINUED | OUTPATIENT
Start: 2019-04-10 | End: 2019-04-10 | Stop reason: HOSPADM

## 2019-04-10 RX ADMIN — SODIUM CHLORIDE, SODIUM LACTATE, POTASSIUM CHLORIDE, CALCIUM CHLORIDE: 600; 310; 30; 20 INJECTION, SOLUTION INTRAVENOUS at 09:32:00

## 2019-04-10 RX ADMIN — GLYCOPYRROLATE 0.4 MG: 0.2 INJECTION INTRAMUSCULAR; INTRAVENOUS at 13:00:00

## 2019-04-10 RX ADMIN — NEOSTIGMINE METHYLSULFATE 3 MG: 0.5 INJECTION INTRAVENOUS at 13:00:00

## 2019-04-10 RX ADMIN — ONDANSETRON 4 MG: 2 INJECTION INTRAMUSCULAR; INTRAVENOUS at 12:47:00

## 2019-04-10 RX ADMIN — DEXAMETHASONE SODIUM PHOSPHATE 4 MG: 4 MG/ML VIAL (ML) INJECTION at 10:07:00

## 2019-04-10 RX ADMIN — LIDOCAINE HYDROCHLORIDE 50 MG: 10 INJECTION, SOLUTION EPIDURAL; INFILTRATION; INTRACAUDAL; PERINEURAL at 10:01:00

## 2019-04-10 RX ADMIN — ROPIVACAINE HYDROCHLORIDE 30 ML: 5 INJECTION, SOLUTION EPIDURAL; INFILTRATION; PERINEURAL at 09:56:00

## 2019-04-10 RX ADMIN — CEFAZOLIN SODIUM/WATER 2 G: 2 G/20 ML SYRINGE (ML) INTRAVENOUS at 10:13:00

## 2019-04-10 RX ADMIN — HYDRALAZINE HYDROCHLORIDE 5 MG: 20 INJECTION INTRAMUSCULAR; INTRAVENOUS at 10:40:00

## 2019-04-10 RX ADMIN — ROCURONIUM BROMIDE 40 MG: 10 INJECTION, SOLUTION INTRAVENOUS at 10:01:00

## 2019-04-10 RX ADMIN — SODIUM CHLORIDE, SODIUM LACTATE, POTASSIUM CHLORIDE, CALCIUM CHLORIDE: 600; 310; 30; 20 INJECTION, SOLUTION INTRAVENOUS at 12:50:00

## 2019-04-10 RX ADMIN — HYDRALAZINE HYDROCHLORIDE 5 MG: 20 INJECTION INTRAMUSCULAR; INTRAVENOUS at 11:01:00

## 2019-04-10 RX ADMIN — SODIUM CHLORIDE, SODIUM LACTATE, POTASSIUM CHLORIDE, CALCIUM CHLORIDE: 600; 310; 30; 20 INJECTION, SOLUTION INTRAVENOUS at 13:19:00

## 2019-04-10 NOTE — PLAN OF CARE
Problem: Patient/Family Goals  Goal: Patient/Family Long Term Goal  Description  Patient's Long Term Goal: I would like to be able to walk without pain  Interventions:  - encourage ambulation  -discuss and implement pain control plan.   -encourage ambulat on pain and pain management  - Manage/alleviate anxiety  - Utilize distraction and/or relaxation techniques  - Monitor for opioid side effects  - Notify MD/LIP if interventions unsuccessful or patient reports new pain  - Anticipate increased pain with acti discharge as needed  - Consider post-discharge preferences of patient/family/discharge partner  - Complete POLST form as appropriate  - Assess patient's ability to be responsible for managing their own health  - Refer to Case Management Department for coor and gait  - Educate and engage patient/family in tolerated activity level and precautions  - Encourage attention to right side  4/10/2019 1718 by Yaa Brambila RN  Outcome: Progressing  4/10/2019 1716 by Yaa Brambila RN  Outcome: Progressing

## 2019-04-10 NOTE — BRIEF OP NOTE
Pre-Operative Diagnosis: Pain due to total right knee replacement, subsequent encounter [T84.84XD, Z96.651]     Post-Operative Diagnosis: Pain due to total right knee replacement, subsequent encounter [C77.98DD, Z96.651]      Procedure Performed:   Procedu

## 2019-04-10 NOTE — CM/SW NOTE
ZAHEER met with the patient and her daughter  at bedside. The patient lives with her spouse in split level home, 6 steps inside, 1 to get in . The patient responds being independent prior to admission with all ADL's, ambulation and driving.  Patient denies use

## 2019-04-10 NOTE — H&P
[facility] Hospitalist H&P     CC: No chief complaint on file. PCP: Shemar Wu DO    Date of Admission: 4/10/2019  8:35 AM    ASSESSMENT / PLAN:     Ms. Ventura Leos is a 67 yo F with PMH of HTN, RA, hypothyroidism who presented for R total knee replacement. Surgical History:   Procedure Laterality Date   • CARPAL TUNNEL RELEASE Bilateral    • CHOLECYSTECTOMY     • D & C  1967   • HIP REPLACEMENT SURGERY Left 11/2017   • HIP SURGERY Left 11/10/2017    Left total hip arthroplasty   • HIP TOTAL REPLACEMENT Left 90 tablet Rfl: 4   alendronate 70 MG Oral Tab TAKE ONE TABLET BY MOUTH ONCE A WEEK Disp: 12 tablet Rfl: 3   Levothyroxine Sodium 100 MCG Oral Tab TAKE 1 TABLET (100 MCG TOTAL) BY MOUTH ONCE DAILY.  Disp: 90 tablet Rfl: 4   [DISCONTINUED] triamcinolone aceto Affect- normal  SKIN: warm, dry  EXT: no edema    Diagnostic Data:    CBC/Chem    No results for input(s): RBC, HGB, HCT, MCV, MCH, MCHC, RDW, NEPRELIM, WBC, PLT in the last 168 hours.       Results for Britney Jensen (MRN [id number]) as of 4/10/2019 16:25 CREATSERUM, GFRAA, GFRNAA, CA, NA, K, CL, CO2 in the last 168 hours. No results for input(s): TROP in the last 168 hours. Additional Diagnostics:     Radiology: Xr Knee (1 Or 2 Views), Right (cpt=73560)    Result Date: 4/10/2019  CONCLUSION:  1.  R

## 2019-04-10 NOTE — BRIEF OP NOTE
Pre-Operative Diagnosis: Pain due to total right knee replacement, subsequent encounter [T84.84XD, Z96.651]     Post-Operative Diagnosis: * No post-op diagnosis entered *      Procedure Performed:   Procedure(s):  RIGHT TOTAL Via Harris Brian

## 2019-04-10 NOTE — INTERVAL H&P NOTE
Pre-op Diagnosis: Pain due to total right knee replacement, subsequent encounter [T84.84XD, Z96.651]    The above referenced H&P was reviewed by Katlyn Hurley DO on 4/10/2019, the patient was examined and no significant changes have occurred in the parag

## 2019-04-10 NOTE — ANESTHESIA PREPROCEDURE EVALUATION
Anesthesia PreOp Note    HPI:     Cathy Crowe is a 68year old female who presents for preoperative consultation requested by: Daphne Chaves DO    Date of Surgery: 4/10/2019    Procedure(s):  KNEE TOTAL REVISION  Indication: Pain due to total r NG   • Pseudoaneurysm of femoral artery ([facility] [address] 2010    Per NG: right common femoral artery pseudoaneurysm repair post cath   • Rheumatoid arthritis [facility])    • Thyroid disease 1983   • Unspecified essential hypertension        Past Surgical History:   Proced Disp: 90 tablet Rfl: 4 4/5/2019   alendronate 70 MG Oral Tab TAKE ONE TABLET BY MOUTH ONCE A WEEK Disp: 12 tablet Rfl: 3 4/3/2019   Levothyroxine Sodium 100 MCG Oral Tab TAKE 1 TABLET (100 MCG TOTAL) BY MOUTH ONCE DAILY.  Disp: 90 tablet Rfl: 4 4/10/2019 at clonidine/epinephrine/ropivacaine/ketorolac (CERTS) pain cocktail  Intra-articular Once (Intra-Op) Chin Victoria, DO      No current Epic-ordered outpatient medications on file.       Codeine                 PAIN    Comment:Abdominal pain    Famil ex partner: Not on file        Emotionally abused: Not on file        Physically abused: Not on file        Forced sexual activity: Not on file    Other Topics      Concerns:         Service: Not Asked        Blood Transfusions: Not Asked        Ca Neuro/Psych - negative ROS     GI/Hepatic/Renal - negative ROS     Endo/Other - negative ROS   Abdominal              Anesthesia Plan:   ASA:  2  Plan:   General  Post-op Pain Management: IV analgesics      I have informed Eun Carbajal and/or legal guard

## 2019-04-10 NOTE — OPERATIVE REPORT
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  Patient wished to proceed        DESCRIPTION OF PROCEDURE:   The patient was brought to the operating room and laid supine on the Operating table after anesthesia was induc very thin.     The tibial was then addressed, the canal was cleaned with curettes.  The tibia was reamed up to 12mm then tibia was cut with a saw blade was used to remove 1-2 mm of surface bone to get under cement mantle cutting off of the sleeve trial. The irrigated and irrisept used. Cement was then mixed with total for 4 batches of cement. The del articular pain cocktail was then placed in the back of the knee and around the soft tissues surrounding the knee.  The tibial and femoral components were cemente

## 2019-04-10 NOTE — ANESTHESIA POSTPROCEDURE EVALUATION
Patient: Josie Paniagua    Procedure Summary     Date:  04/10/19 Room / Location:  [facility] OR  / [facility] OR    Anesthesia Start:  5296 Anesthesia Stop:  1326    Procedure:  KNEE TOTAL REVISION (Right ) Diagnosis:       Pain due to total right knee repl

## 2019-04-10 NOTE — ANESTHESIA PROCEDURE NOTES
Peripheral Block    Anesthesiologist:  Leti Redman MD  Performed by:   Anesthesiologist  Patient Location:  PACU  Start Time:  4/10/2019 9:40 AM  End Time:  4/10/2019 9:44 AM  Site Identification: ultrasound guided, real time ultrasound guided and IM

## 2019-04-10 NOTE — ANESTHESIA PROCEDURE NOTES
Airway  Date/Time: 4/10/2019 10:04 AM  Urgency: elective    Airway not difficult    General Information and Staff    Patient location during procedure: OR  Anesthesiologist: Tico Ochoa MD  Resident/CRNA: Erica Real CRNA  Performed: CRNA     Niki

## 2019-04-11 PROBLEM — Z01.818 PREOP TESTING: Status: ACTIVE | Noted: 2017-10-29

## 2019-04-11 PROCEDURE — 97535 SELF CARE MNGMENT TRAINING: CPT

## 2019-04-11 PROCEDURE — 85025 COMPLETE CBC W/AUTO DIFF WBC: CPT | Performed by: HOSPITALIST

## 2019-04-11 PROCEDURE — 97166 OT EVAL MOD COMPLEX 45 MIN: CPT

## 2019-04-11 PROCEDURE — 97530 THERAPEUTIC ACTIVITIES: CPT

## 2019-04-11 PROCEDURE — 97161 PT EVAL LOW COMPLEX 20 MIN: CPT

## 2019-04-11 PROCEDURE — 97116 GAIT TRAINING THERAPY: CPT

## 2019-04-11 RX ORDER — BISACODYL 10 MG
10 SUPPOSITORY, RECTAL RECTAL
Status: DISCONTINUED | OUTPATIENT
Start: 2019-04-11 | End: 2019-04-12

## 2019-04-11 RX ORDER — LISINOPRIL 40 MG/1
40 TABLET ORAL DAILY
Status: DISCONTINUED | OUTPATIENT
Start: 2019-04-12 | End: 2019-04-12

## 2019-04-11 RX ORDER — DOCUSATE SODIUM 100 MG/1
100 CAPSULE, LIQUID FILLED ORAL 2 TIMES DAILY
Status: DISCONTINUED | OUTPATIENT
Start: 2019-04-11 | End: 2019-04-12

## 2019-04-11 RX ORDER — SODIUM PHOSPHATE, DIBASIC AND SODIUM PHOSPHATE, MONOBASIC 7; 19 G/133ML; G/133ML
1 ENEMA RECTAL ONCE AS NEEDED
Status: DISCONTINUED | OUTPATIENT
Start: 2019-04-11 | End: 2019-04-12

## 2019-04-11 RX ORDER — OXYCODONE AND ACETAMINOPHEN 10; 325 MG/1; MG/1
1 TABLET ORAL EVERY 4 HOURS PRN
Status: DISCONTINUED | OUTPATIENT
Start: 2019-04-11 | End: 2019-04-12

## 2019-04-11 RX ORDER — 0.9 % SODIUM CHLORIDE 0.9 %
VIAL (ML) INJECTION
Status: DISPENSED
Start: 2019-04-11 | End: 2019-04-11

## 2019-04-11 RX ORDER — POLYETHYLENE GLYCOL 3350 17 G/17G
17 POWDER, FOR SOLUTION ORAL DAILY PRN
Status: DISCONTINUED | OUTPATIENT
Start: 2019-04-11 | End: 2019-04-12

## 2019-04-11 NOTE — PHYSICAL THERAPY NOTE
PHYSICAL THERAPY KNEE EVALUATION - INPATIENT       Room Number: 075/076-I  Evaluation Date: 4/11/2019  Type of Evaluation: Initial  Physician Order: PT Eval and Treat    Presenting Problem: R total knee revision  Reason for Therapy: Mobility Dysfunction an DISCHARGE RECOMMENDATIONS  PT Discharge Recommendations: Home with home health PT    PLAN  PT Treatment Plan: Bed mobility; Don/doff brace; Patient education; Family education;Gait training;Stair training;Strengthening;Stoop training;Transfer training Home: House   Home Layout: Two level  Stairs to Enter : 1     Stairs to [employer]:  6(steps up/6 steps down)  Railing: Yes(B HR's in reach)    Lives With: Spouse(Also has kids in the area who can intermittently assist)  Drives: Yes  Patient Owned Equipment: R much help from another person does the patient currently need. ..   -   Moving to and from a bed to a chair (including a wheelchair)?: A Little   -   Need to walk in hospital room?: A Little   -   Climbing 3-5 steps with a railing?: A Lot     AM-PAC Score:

## 2019-04-11 NOTE — PHYSICAL THERAPY NOTE
PHYSICAL THERAPY KNEE TREATMENT NOTE - INPATIENT     Room Number: 541/869-W             Presenting Problem: R total knee revision    Problem List  Active Problems:    Preop testing      PHYSICAL THERAPY ASSESSMENT   Pt was seen for follow-up PT session aft TOLERANCE  Pulse: 83(after mobility)                      O2 WALK     SPO2 Ambulation on Room Air: 91            AM-PAC '6-Clicks' INPATIENT SHORT FORM - BASIC MOBILITY  How much difficulty does the patient currently have. ..  -   Turning over in bed (inclu 300 feet with assistive device at assistance level: modified independent    Goal #3   Current Status 2X150' with RW, SBA   Goal #4 Patient will negotiate 6 stairs/one curb w/ assistive device and supervision   Goal #4   Current Status NT   Goal #5     Goal

## 2019-04-11 NOTE — CM/SW NOTE
SW informed [facility] of patient's DC today, 4.11.19    Specific HHC orders have been entered.       Asif Gilmore LMSW., [id number]

## 2019-04-11 NOTE — PROGRESS NOTES
[address] Patient Status:  Inpatient    1945 MRN [id number]   Location [facility] 4W/SW/SE Attending Milton Amezcua, [address] [facility] Day # 1 PCP DO Aliya Cuello Zachariah is a 68year old female Comment:Abdominal pain    Active Problems:    * No active hospital problems. *      Blood pressure 126/54, pulse 72, temperature 98 °F (36.7 °C), temperature source Oral, resp.  rate 16, height 4' 10\" (1.473 m), weight 156 lb (70.8 kg), SpO2 97 %, not curr

## 2019-04-11 NOTE — OCCUPATIONAL THERAPY NOTE
OCCUPATIONAL THERAPY EVALUATION - INPATIENT      Room Number: 647/249-T  Evaluation Date: 4/11/2019  Type of Evaluation: Initial  Presenting Problem: (R TKR)    Physician Order: IP Consult to Occupational Therapy  Reason for Therapy: ADL/IADL Dysfunction a Activity Short Form. Research supports that patients with this level of impairment and may d/c home with no services, however given her forgetfulness and impulsivity, she may benefit from [facility]. Will continue to assess. Notified RN.     DISCHARGE RECOMMENDATI REPLACEMENT Bilateral    • TUBAL LIGATION  1981       HOME SITUATION  Type of Home: House  Home Layout: Two level  Lives With: Spouse(Also has kids in the area who can intermittently assist)        Drives: Yes  Patient Regularly Uses: Glasses    Stairs in NT    Bedroom Mobility: CGA with use of RW    BALANCE ASSESSMENT  Static Sitting: good  Dynamic Sitting: good  Static Standing: fair  Dynamic Standing: fair    FUNCTIONAL ADL ASSESSMENT  Grooming: indep  Feeding: indep  Bathing: CGA  Toileting: CGA  Upper

## 2019-04-11 NOTE — PLAN OF CARE
Problem: Patient/Family Goals  Goal: Patient/Family Long Term Goal  Description  Patient's Long Term Goal: I would like to be able to walk without pain  Interventions:  - encourage ambulation  -discuss and implement pain control plan.   -encourage ambulat Progressing     Problem: RISK FOR INFECTION - ADULT  Goal: Absence of fever/infection during anticipated neutropenic period  Description  INTERVENTIONS  - Monitor WBC  - Administer growth factors as ordered  - Implement neutropenic guidelines  Outcome: Pro Monitor intake/output and perform bladder scan as needed  - Follow urinary retention protocol/standard of care  - Consider collaborating with pharmacy to review patient's medication profile  - Implement strategies to promote bladder emptying  Outcome: Prog

## 2019-04-11 NOTE — PROGRESS NOTES
[facility] Hospitalist Progress Note     Reason for Admission: Right TKR  PCP: Beba Ochoa DO     Assessment/Plan:     Active Problems:    Preop testing  Ms. Dylan Melchor is a 69 yo F with PMH of HTN, RA, hypothyroidism who presented for R total knee replacement. intact  Psych: Affect- normal  SKIN: warm, dry  EXT: no edema      Data Review:       Labs:     Recent Labs   Lab 04/11/19  0435   RBC 3.03*   HGB 10.2*   HCT 31.3*   .3*   MCH 33.7   MCHC 32.6   RDW 13.6   NEPRELIM 8.75*   WBC 10.0   .0 HYDROmorphone HCl **OR** HYDROmorphone HCl, ondansetron HCl, Metoclopramide HCl, Prochlorperazine Edisylate, Ketorolac Tromethamine

## 2019-04-11 NOTE — PLAN OF CARE
Problem: Patient/Family Goals  Goal: Patient/Family Long Term Goal  Description  Patient's Long Term Goal: I would like to be able to walk without pain  Interventions: Taking Norco prn pain  - encourage ambulation  -discuss and implement pain control juan appropriate  Outcome: Progressing     Problem: RISK FOR INFECTION - ADULT  Goal: Absence of fever/infection during anticipated neutropenic period  Description  INTERVENTIONS  - Monitor WBC  - Administer growth factors as ordered  - Implement neutropenic gu void and empty bladder  - Monitor intake/output and perform bladder scan as needed  - Follow urinary retention protocol/standard of care  - Consider collaborating with pharmacy to review patient's medication profile  - Implement strategies to promote bladd

## 2019-04-11 NOTE — HOME CARE LIAISON
Met with patient at the bedside. Patient is agreeable to [facility]. Brochure and liaison's card provided with contact information. All questions addressed and answered.

## 2019-04-12 VITALS
RESPIRATION RATE: 18 BRPM | HEART RATE: 92 BPM | SYSTOLIC BLOOD PRESSURE: 135 MMHG | HEIGHT: 58 IN | TEMPERATURE: 98 F | BODY MASS INDEX: 32.75 KG/M2 | DIASTOLIC BLOOD PRESSURE: 55 MMHG | OXYGEN SATURATION: 92 % | WEIGHT: 156 LBS

## 2019-04-12 PROCEDURE — 97116 GAIT TRAINING THERAPY: CPT

## 2019-04-12 PROCEDURE — 97110 THERAPEUTIC EXERCISES: CPT

## 2019-04-12 NOTE — DISCHARGE SUMMARY
[facility] Hospitalist Discharge Summary   Patient ID:  Jayson Holt [id number]  3 year old 6/24/1945    Admit date: 4/10/2019  Discharge date: 4/12/2019  Risk of Readmission Lace+ Score: 49  59-90 High Risk  29-58 Medium Risk  0-28   Low Risk    Primary Care Tabs  Commonly known as:  VITAMIN D3  Take 1 tablet by mouth daily.   What changed:  how much to take        CONTINUE taking these medications    alendronate 70 MG Tabs  Commonly known as:  FOSAMAX  TAKE ONE TABLET BY MOUTH ONCE A WEEK     aspirin  MG physician. Activity: activity as tolerated  Diet: regular diet  Wound Care: as directed  Code Status: Full Code    Important follow up: Follow-up Information     Go to Juliet Santos DO.     Specialty:  SURGERY, ORTHOPEDIC  Why:  For wou

## 2019-04-12 NOTE — CM/SW NOTE
ZAHEER informed [facility] of patient's DC today, 4.12.19. Specific HHC orders have been entered.     Javier Cordova, [address] [facility]

## 2019-04-12 NOTE — PHYSICAL THERAPY NOTE
PHYSICAL THERAPY TREATMENT NOTE - INPATIENT     Room Number: 764/397-Y       Presenting Problem: R total knee revision    Problem List  Active Problems:    Preop testing      PHYSICAL THERAPY ASSESSMENT   Pt was seen for follow-up PT session after consulti +        AM-PAC '6-Clicks' INPATIENT SHORT FORM - BASIC MOBILITY  How much difficulty does the patient currently have. ..  -   Turning over in bed (including adjusting bedclothes, sheets and blankets)?: None   -   Sitting down on and standing up from a dorothy rajeshb w/ assistive device and supervision   Goal #4   Current Status Goal Met   Goal #5     Goal #5   Current Status    Goal #6 Patient independently performs home exercise program for ROM/strengthening per the instructions provided in preparation for disch

## 2019-04-12 NOTE — PLAN OF CARE
Problem: Patient/Family Goals  Goal: Patient/Family Long Term Goal  Description  Patient's Long Term Goal: I would like to be able to walk without pain  Interventions:  - encourage ambulation  -discuss and implement pain control plan.   -encourage ambulat pain with activity and pre-medicate as appropriate  Outcome: Progressing     Problem: RISK FOR INFECTION - ADULT  Goal: Absence of fever/infection during anticipated neutropenic period  Description  INTERVENTIONS  - Monitor WBC  - Administer growth factors retention  Description  INTERVENTIONS:  - Assess patient?s ability to void and empty bladder  - Monitor intake/output and perform bladder scan as needed  - Follow urinary retention protocol/standard of care  - Consider collaborating with pharmacy to review

## 2019-04-12 NOTE — SPIRITUAL CARE NOTE
Pt was sitting upright in chair and dtrs at bedside. Pt stated that she has been a good person doing many good things for others although her life is not always in good situation.  The  offered emotional support by sharing with her a quote \"true be

## 2019-04-26 PROBLEM — Z47.89 ORTHOPEDIC AFTERCARE: Status: ACTIVE | Noted: 2019-04-26

## 2019-05-02 PROCEDURE — 80074 ACUTE HEPATITIS PANEL: CPT | Performed by: INTERNAL MEDICINE

## 2019-05-02 PROCEDURE — 86480 TB TEST CELL IMMUN MEASURE: CPT | Performed by: INTERNAL MEDICINE

## 2019-05-02 PROCEDURE — 82955 ASSAY OF G6PD ENZYME: CPT | Performed by: INTERNAL MEDICINE

## 2019-05-02 PROCEDURE — 86704 HEP B CORE ANTIBODY TOTAL: CPT | Performed by: INTERNAL MEDICINE

## 2019-05-02 PROCEDURE — 86038 ANTINUCLEAR ANTIBODIES: CPT | Performed by: INTERNAL MEDICINE

## 2019-05-08 PROBLEM — Z96.651 STATUS POST REVISION OF TOTAL REPLACEMENT OF RIGHT KNEE: Status: ACTIVE | Noted: 2019-05-08

## 2019-11-23 DIAGNOSIS — Z51.81 MEDICATION MONITORING ENCOUNTER: Primary | ICD-10-CM

## 2019-11-25 NOTE — TELEPHONE ENCOUNTER
Please call patient (see telephone encounter):  Please clarify Rheumatologist that she is receiving care from  I can not refill her medication if she is no longer under my care  She needs to get refills from Rheumatologist who is providing her care

## 2019-11-25 NOTE — TELEPHONE ENCOUNTER
Called patient no ans., LVM for patient to [facility] office regarding which Rheum physician she will be following

## 2019-11-27 NOTE — TELEPHONE ENCOUNTER
Pt returned call states Dr. Mary Reed is her only Rheumatologist. Pt is no longer seeing Ann-Marie Branham Rheum.

## 2019-11-27 NOTE — TELEPHONE ENCOUNTER
Patient needs labs for medication refill  Patient needs to schedule an appt for additional refills    Please let me know when complete

## 2019-11-29 ENCOUNTER — TELEPHONE (OUTPATIENT)
Dept: RHEUMATOLOGY | Facility: CLINIC | Age: 74
End: 2019-11-29

## 2019-11-29 NOTE — TELEPHONE ENCOUNTER
Mary from [facility] and states they need verbal or a fax for the patient prescription     #Fax [phone]    [facility] (tofacitinib

## 2019-11-29 NOTE — TELEPHONE ENCOUNTER
LOV with Dr. Jessee Cervantes: 11/2018. Per 11/23 telephone encounter, pt is following with rheumatologist Dr. Yisel Tirado. [facility] notified via fax.

## 2019-12-03 RX ORDER — LEVOTHYROXINE SODIUM 100 UG/1
TABLET ORAL
Qty: 90 TABLET | Refills: 4 | OUTPATIENT
Start: 2019-12-03

## 2019-12-03 NOTE — TELEPHONE ENCOUNTER
Please review; protocol failed.     Hypothyroid Medications  Protocol Criteria:  Appointment scheduled in the past 12 months or the next 3 months  TSH resulted in the past 12 months that is normal  Recent Outpatient Visits            3 weeks ago Fibromyalgia, secondary    [facility] Shalom Lei MD    Office Visit    4 weeks ago Acute cystitis with hematuria    [facility] Mayer Sicard, MD    Office Visit    2 months ago Vitamin D deficiency    [address] Davon Lei MD    Office Visit    4 months ago Rheumatoid arthritis involving multiple sites, unspecified rheumatoid factor presence ([facility] [address]    Raphael Wolfe MD    Office Visit    4 months ago PO Right knee revision 4/10/19 -Dr. Rubi Ballard Gx 7/9/19    [place] Villela Mai, DO    Office Visit        Future Appointments       Provider Department Appt Notes    In 3 months Jama Settler Elsie Kehr, MD [facility]    In 4 months Douglas Khalil  [facility] FU/RT TOTAL KNEE        Lab Results   Component Value Date    TSH 0.07 (L) 01/08/2019    THYROIDFUNC 0.05 (L) 12/16/2014

## 2019-12-12 RX ORDER — TOFACITINIB 11 MG/1
TABLET, FILM COATED, EXTENDED RELEASE ORAL
Qty: 90 TABLET | Refills: 1 | Status: SHIPPED | OUTPATIENT
Start: 2019-12-12 | End: 2020-06-09

## 2019-12-12 NOTE — TELEPHONE ENCOUNTER
Labs completed 12/2/19    Future Appointments   Date Time Provider Guido Hoover   1/2/2020 11:30 AM Chai Jacobson DO [facility]   3/19/2020  1:00 PM Hope Dennis MD [facility]   4/14/2020 12:40 PM [facility]   4/14/2020  1:30 PM Daniela Cotto DO [facility]

## 2019-12-26 PROBLEM — D84.9 IMMUNOSUPPRESSED STATUS (HCC): Status: ACTIVE | Noted: 2019-12-26

## 2019-12-26 PROBLEM — I77.1 TORTUOUS AORTA (HCC): Status: ACTIVE | Noted: 2019-12-26

## 2020-01-02 PROBLEM — E66.01 SEVERE OBESITY (BMI 35.0-39.9) WITH COMORBIDITY (HCC): Status: ACTIVE | Noted: 2020-01-02

## 2020-01-03 PROBLEM — T84.052A: Status: RESOLVED | Noted: 2018-11-27 | Resolved: 2020-01-03

## 2020-06-09 DIAGNOSIS — Z51.81 MEDICATION MONITORING ENCOUNTER: ICD-10-CM

## 2020-06-09 RX ORDER — TOFACITINIB 11 MG/1
TABLET, FILM COATED, EXTENDED RELEASE ORAL
Qty: 90 TABLET | Refills: 0 | Status: SHIPPED | OUTPATIENT
Start: 2020-06-09 | End: 2020-11-24

## 2020-06-29 PROBLEM — I77.9 CAROTID ARTERY DISEASE (HCC): Status: ACTIVE | Noted: 2020-06-29

## 2020-07-06 PROBLEM — F33.0 MAJOR DEPRESSIVE DISORDER, RECURRENT EPISODE, MILD (HCC): Status: ACTIVE | Noted: 2020-07-06

## 2020-11-23 DIAGNOSIS — Z51.81 MEDICATION MONITORING ENCOUNTER: ICD-10-CM

## 2020-11-24 RX ORDER — TOFACITINIB 11 MG/1
TABLET, FILM COATED, EXTENDED RELEASE ORAL
Qty: 90 TABLET | Refills: 0 | Status: SHIPPED | OUTPATIENT
Start: 2020-11-24 | End: 2021-11-05 | Stop reason: ALTCHOICE

## 2021-02-02 DIAGNOSIS — Z23 NEED FOR VACCINATION: ICD-10-CM

## 2021-07-29 PROBLEM — N18.5 CHRONIC RENAL DISEASE, STAGE V (HCC): Status: ACTIVE | Noted: 2021-07-29

## 2021-07-29 PROBLEM — N18.5 CHRONIC RENAL DISEASE, STAGE V (HCC): Status: RESOLVED | Noted: 2021-07-29 | Resolved: 2021-07-29

## 2021-08-13 ENCOUNTER — LAB ENCOUNTER (OUTPATIENT)
Dept: LAB | Age: 76
End: 2021-08-13
Attending: INTERNAL MEDICINE
Payer: MEDICARE

## 2021-08-13 DIAGNOSIS — Z51.81 MEDICATION MONITORING ENCOUNTER: ICD-10-CM

## 2021-08-13 DIAGNOSIS — M06.9 RHEUMATOID ARTHRITIS INVOLVING MULTIPLE SITES (HCC): ICD-10-CM

## 2021-08-13 LAB
BASOPHILS # BLD AUTO: 0.05 X10(3) UL (ref 0–0.2)
BASOPHILS NFR BLD AUTO: 0.9 %
DEPRECATED RDW RBC AUTO: 51.5 FL (ref 35.1–46.3)
EOSINOPHIL # BLD AUTO: 0.13 X10(3) UL (ref 0–0.7)
EOSINOPHIL NFR BLD AUTO: 2.4 %
ERYTHROCYTE [DISTWIDTH] IN BLOOD BY AUTOMATED COUNT: 13.3 % (ref 11–15)
ERYTHROCYTE [SEDIMENTATION RATE] IN BLOOD: 11 MM/HR
HCT VFR BLD AUTO: 33.7 %
HGB BLD-MCNC: 11.3 G/DL
IMM GRANULOCYTES # BLD AUTO: 0.09 X10(3) UL (ref 0–1)
IMM GRANULOCYTES NFR BLD: 1.7 %
LYMPHOCYTES # BLD AUTO: 0.94 X10(3) UL (ref 1–4)
LYMPHOCYTES NFR BLD AUTO: 17.6 %
MCH RBC QN AUTO: 35.4 PG (ref 26–34)
MCHC RBC AUTO-ENTMCNC: 33.5 G/DL (ref 31–37)
MCV RBC AUTO: 105.6 FL
MONOCYTES # BLD AUTO: 0.53 X10(3) UL (ref 0.1–1)
MONOCYTES NFR BLD AUTO: 9.9 %
NEUTROPHILS # BLD AUTO: 3.61 X10 (3) UL (ref 1.5–7.7)
NEUTROPHILS # BLD AUTO: 3.61 X10(3) UL (ref 1.5–7.7)
NEUTROPHILS NFR BLD AUTO: 67.5 %
PLATELET # BLD AUTO: 191 10(3)UL (ref 150–450)
RBC # BLD AUTO: 3.19 X10(6)UL
WBC # BLD AUTO: 5.4 X10(3) UL (ref 4–11)

## 2021-08-13 PROCEDURE — 85025 COMPLETE CBC W/AUTO DIFF WBC: CPT

## 2021-08-13 PROCEDURE — 36415 COLL VENOUS BLD VENIPUNCTURE: CPT

## 2021-08-13 PROCEDURE — 85652 RBC SED RATE AUTOMATED: CPT

## 2021-10-30 ENCOUNTER — LAB ENCOUNTER (OUTPATIENT)
Dept: LAB | Age: 76
End: 2021-10-30
Attending: INTERNAL MEDICINE
Payer: MEDICARE

## 2021-10-30 DIAGNOSIS — Z71.85 VACCINE COUNSELING: ICD-10-CM

## 2021-10-30 DIAGNOSIS — M81.0 AGE-RELATED OSTEOPOROSIS WITHOUT CURRENT PATHOLOGICAL FRACTURE: ICD-10-CM

## 2021-10-30 DIAGNOSIS — M06.9 RHEUMATOID ARTHRITIS INVOLVING MULTIPLE SITES, UNSPECIFIED WHETHER RHEUMATOID FACTOR PRESENT (HCC): ICD-10-CM

## 2021-10-30 DIAGNOSIS — D84.9 IMMUNOCOMPROMISED STATE (HCC): ICD-10-CM

## 2021-10-30 DIAGNOSIS — D58.2 OTHER HEMOGLOBINOPATHIES (HCC): ICD-10-CM

## 2021-10-30 PROCEDURE — 84520 ASSAY OF UREA NITROGEN: CPT

## 2021-10-30 PROCEDURE — 82565 ASSAY OF CREATININE: CPT

## 2021-10-30 PROCEDURE — 83010 ASSAY OF HAPTOGLOBIN QUANT: CPT

## 2021-10-30 PROCEDURE — 86140 C-REACTIVE PROTEIN: CPT

## 2021-10-30 PROCEDURE — 36415 COLL VENOUS BLD VENIPUNCTURE: CPT

## 2021-10-30 PROCEDURE — 80061 LIPID PANEL: CPT

## 2021-10-30 PROCEDURE — 80076 HEPATIC FUNCTION PANEL: CPT

## 2021-10-30 PROCEDURE — 83615 LACTATE (LD) (LDH) ENZYME: CPT

## 2021-10-30 PROCEDURE — 85025 COMPLETE CBC W/AUTO DIFF WBC: CPT

## 2021-10-30 PROCEDURE — 86480 TB TEST CELL IMMUN MEASURE: CPT

## 2021-10-30 PROCEDURE — 85652 RBC SED RATE AUTOMATED: CPT

## 2021-12-23 PROBLEM — I65.23 BILATERAL CAROTID ARTERY STENOSIS: Status: ACTIVE | Noted: 2020-06-29

## 2022-01-25 PROBLEM — D58.2 OTHER HEMOGLOBINOPATHIES (HCC): Status: ACTIVE | Noted: 2022-01-25

## 2022-01-25 PROBLEM — E11.9 TYPE 2 DIABETES MELLITUS WITHOUT COMPLICATION, WITHOUT LONG-TERM CURRENT USE OF INSULIN (HCC): Status: ACTIVE | Noted: 2022-01-25

## 2022-01-29 ENCOUNTER — LAB ENCOUNTER (OUTPATIENT)
Dept: LAB | Age: 77
End: 2022-01-29
Attending: INTERNAL MEDICINE
Payer: MEDICARE

## 2022-01-29 DIAGNOSIS — D84.9 IMMUNOCOMPROMISED STATE (HCC): ICD-10-CM

## 2022-01-29 DIAGNOSIS — E11.9 TYPE 2 DIABETES MELLITUS WITHOUT COMPLICATION, WITHOUT LONG-TERM CURRENT USE OF INSULIN (HCC): ICD-10-CM

## 2022-01-29 DIAGNOSIS — M06.9 RHEUMATOID ARTHRITIS INVOLVING MULTIPLE SITES, UNSPECIFIED WHETHER RHEUMATOID FACTOR PRESENT (HCC): ICD-10-CM

## 2022-01-29 DIAGNOSIS — D58.2 OTHER HEMOGLOBINOPATHIES (HCC): ICD-10-CM

## 2022-01-29 DIAGNOSIS — Z51.81 MEDICATION MONITORING ENCOUNTER: ICD-10-CM

## 2022-01-29 DIAGNOSIS — M81.0 AGE-RELATED OSTEOPOROSIS WITHOUT CURRENT PATHOLOGICAL FRACTURE: ICD-10-CM

## 2022-01-29 LAB
ALBUMIN SERPL-MCNC: 3.8 G/DL (ref 3.4–5)
ALP LIVER SERPL-CCNC: 68 U/L
ALT SERPL-CCNC: 20 U/L
AST SERPL-CCNC: 23 U/L (ref 15–37)
BASOPHILS # BLD AUTO: 0.06 X10(3) UL (ref 0–0.2)
BASOPHILS NFR BLD AUTO: 1.4 %
BILIRUB DIRECT SERPL-MCNC: 0.2 MG/DL (ref 0–0.2)
BILIRUB SERPL-MCNC: 0.4 MG/DL (ref 0.1–2)
BUN BLD-MCNC: 13 MG/DL (ref 7–18)
CREAT BLD-MCNC: 0.9 MG/DL
CREAT UR-SCNC: <13 MG/DL
CRP SERPL-MCNC: <0.29 MG/DL (ref ?–0.3)
DEPRECATED RDW RBC AUTO: 51.5 FL (ref 35.1–46.3)
EOSINOPHIL # BLD AUTO: 0.18 X10(3) UL (ref 0–0.7)
EOSINOPHIL NFR BLD AUTO: 4.2 %
ERYTHROCYTE [DISTWIDTH] IN BLOOD BY AUTOMATED COUNT: 14 % (ref 11–15)
ERYTHROCYTE [SEDIMENTATION RATE] IN BLOOD: 21 MM/HR
EST. AVERAGE GLUCOSE BLD GHB EST-MCNC: 100 MG/DL (ref 68–126)
HAPTOGLOB SERPL-MCNC: 63.5 MG/DL (ref 30–200)
HBA1C MFR BLD: 5.1 % (ref ?–5.7)
HCT VFR BLD AUTO: 36 %
HGB BLD-MCNC: 11.7 G/DL
IMM GRANULOCYTES # BLD AUTO: 0.01 X10(3) UL (ref 0–1)
IMM GRANULOCYTES NFR BLD: 0.2 %
LDH SERPL L TO P-CCNC: 225 U/L
LYMPHOCYTES # BLD AUTO: 0.92 X10(3) UL (ref 1–4)
LYMPHOCYTES NFR BLD AUTO: 21.5 %
MCH RBC QN AUTO: 32.7 PG (ref 26–34)
MCHC RBC AUTO-ENTMCNC: 32.5 G/DL (ref 31–37)
MCV RBC AUTO: 100.6 FL
MICROALBUMIN UR-MCNC: 0.65 MG/DL
MONOCYTES # BLD AUTO: 0.41 X10(3) UL (ref 0.1–1)
MONOCYTES NFR BLD AUTO: 9.6 %
NEUTROPHILS # BLD AUTO: 2.69 X10 (3) UL (ref 1.5–7.7)
NEUTROPHILS # BLD AUTO: 2.69 X10(3) UL (ref 1.5–7.7)
NEUTROPHILS NFR BLD AUTO: 63.1 %
PLATELET # BLD AUTO: 191 10(3)UL (ref 150–450)
PROT SERPL-MCNC: 6.5 G/DL (ref 6.4–8.2)
RBC # BLD AUTO: 3.58 X10(6)UL
WBC # BLD AUTO: 4.3 X10(3) UL (ref 4–11)

## 2022-01-29 PROCEDURE — 84520 ASSAY OF UREA NITROGEN: CPT

## 2022-01-29 PROCEDURE — 80076 HEPATIC FUNCTION PANEL: CPT

## 2022-01-29 PROCEDURE — 85652 RBC SED RATE AUTOMATED: CPT

## 2022-01-29 PROCEDURE — 82565 ASSAY OF CREATININE: CPT

## 2022-01-29 PROCEDURE — 86140 C-REACTIVE PROTEIN: CPT

## 2022-01-29 PROCEDURE — 83615 LACTATE (LD) (LDH) ENZYME: CPT

## 2022-01-29 PROCEDURE — 82570 ASSAY OF URINE CREATININE: CPT

## 2022-01-29 PROCEDURE — 82043 UR ALBUMIN QUANTITATIVE: CPT

## 2022-01-29 PROCEDURE — 85025 COMPLETE CBC W/AUTO DIFF WBC: CPT

## 2022-01-29 PROCEDURE — 36415 COLL VENOUS BLD VENIPUNCTURE: CPT

## 2022-01-29 PROCEDURE — 83036 HEMOGLOBIN GLYCOSYLATED A1C: CPT

## 2022-01-29 PROCEDURE — 83010 ASSAY OF HAPTOGLOBIN QUANT: CPT

## 2022-05-02 ENCOUNTER — LAB ENCOUNTER (OUTPATIENT)
Dept: LAB | Age: 77
End: 2022-05-02
Attending: INTERNAL MEDICINE
Payer: MEDICARE

## 2022-05-02 DIAGNOSIS — M06.9 RHEUMATOID ARTHRITIS INVOLVING MULTIPLE SITES, UNSPECIFIED WHETHER RHEUMATOID FACTOR PRESENT (HCC): ICD-10-CM

## 2022-05-02 DIAGNOSIS — D58.2 OTHER HEMOGLOBINOPATHIES (HCC): ICD-10-CM

## 2022-05-02 DIAGNOSIS — Z51.81 MEDICATION MONITORING ENCOUNTER: ICD-10-CM

## 2022-05-02 DIAGNOSIS — M81.0 AGE-RELATED OSTEOPOROSIS WITHOUT CURRENT PATHOLOGICAL FRACTURE: ICD-10-CM

## 2022-05-02 LAB
ALBUMIN SERPL-MCNC: 3.8 G/DL (ref 3.4–5)
ALBUMIN/GLOB SERPL: 1.4 {RATIO} (ref 1–2)
ALP LIVER SERPL-CCNC: 66 U/L
ALT SERPL-CCNC: 19 U/L
ANION GAP SERPL CALC-SCNC: 5 MMOL/L (ref 0–18)
AST SERPL-CCNC: 20 U/L (ref 15–37)
BASOPHILS # BLD AUTO: 0.06 X10(3) UL (ref 0–0.2)
BASOPHILS NFR BLD AUTO: 1 %
BILIRUB SERPL-MCNC: 0.6 MG/DL (ref 0.1–2)
BUN BLD-MCNC: 20 MG/DL (ref 7–18)
BUN/CREAT SERPL: 24.7 (ref 10–20)
CALCIUM BLD-MCNC: 9.8 MG/DL (ref 8.5–10.1)
CHLORIDE SERPL-SCNC: 105 MMOL/L (ref 98–112)
CO2 SERPL-SCNC: 30 MMOL/L (ref 21–32)
CREAT BLD-MCNC: 0.81 MG/DL
CRP SERPL-MCNC: 0.44 MG/DL (ref ?–0.3)
DEPRECATED RDW RBC AUTO: 55.5 FL (ref 35.1–46.3)
EOSINOPHIL # BLD AUTO: 0.23 X10(3) UL (ref 0–0.7)
EOSINOPHIL NFR BLD AUTO: 3.9 %
ERYTHROCYTE [DISTWIDTH] IN BLOOD BY AUTOMATED COUNT: 14.2 % (ref 11–15)
ERYTHROCYTE [SEDIMENTATION RATE] IN BLOOD: 25 MM/HR
FASTING STATUS PATIENT QL REPORTED: NO
GLOBULIN PLAS-MCNC: 2.7 G/DL (ref 2.8–4.4)
GLUCOSE BLD-MCNC: 93 MG/DL (ref 70–99)
HAPTOGLOB SERPL-MCNC: 29.3 MG/DL (ref 30–200)
HCT VFR BLD AUTO: 32 %
HGB BLD-MCNC: 10 G/DL
IMM GRANULOCYTES # BLD AUTO: 0.05 X10(3) UL (ref 0–1)
IMM GRANULOCYTES NFR BLD: 0.8 %
LDH SERPL L TO P-CCNC: 214 U/L
LYMPHOCYTES # BLD AUTO: 1.07 X10(3) UL (ref 1–4)
LYMPHOCYTES NFR BLD AUTO: 18.1 %
MAGNESIUM SERPL-MCNC: 1.7 MG/DL (ref 1.6–2.6)
MCH RBC QN AUTO: 33.8 PG (ref 26–34)
MCHC RBC AUTO-ENTMCNC: 31.3 G/DL (ref 31–37)
MCV RBC AUTO: 108.1 FL
MONOCYTES # BLD AUTO: 0.53 X10(3) UL (ref 0.1–1)
MONOCYTES NFR BLD AUTO: 9 %
NEUTROPHILS # BLD AUTO: 3.97 X10 (3) UL (ref 1.5–7.7)
NEUTROPHILS # BLD AUTO: 3.97 X10(3) UL (ref 1.5–7.7)
NEUTROPHILS NFR BLD AUTO: 67.2 %
OSMOLALITY SERPL CALC.SUM OF ELEC: 292 MOSM/KG (ref 275–295)
PHOSPHATE SERPL-MCNC: 3.5 MG/DL (ref 2.5–4.9)
PLATELET # BLD AUTO: 194 10(3)UL (ref 150–450)
POTASSIUM SERPL-SCNC: 4.3 MMOL/L (ref 3.5–5.1)
PROT SERPL-MCNC: 6.5 G/DL (ref 6.4–8.2)
RBC # BLD AUTO: 2.96 X10(6)UL
SODIUM SERPL-SCNC: 140 MMOL/L (ref 136–145)
WBC # BLD AUTO: 5.9 X10(3) UL (ref 4–11)

## 2022-05-02 PROCEDURE — 80053 COMPREHEN METABOLIC PANEL: CPT

## 2022-05-02 PROCEDURE — 85025 COMPLETE CBC W/AUTO DIFF WBC: CPT

## 2022-05-02 PROCEDURE — 83735 ASSAY OF MAGNESIUM: CPT

## 2022-05-02 PROCEDURE — 84100 ASSAY OF PHOSPHORUS: CPT

## 2022-05-02 PROCEDURE — 83010 ASSAY OF HAPTOGLOBIN QUANT: CPT

## 2022-05-02 PROCEDURE — 86140 C-REACTIVE PROTEIN: CPT

## 2022-05-02 PROCEDURE — 85652 RBC SED RATE AUTOMATED: CPT

## 2022-05-02 PROCEDURE — 83615 LACTATE (LD) (LDH) ENZYME: CPT

## 2022-05-02 PROCEDURE — 36415 COLL VENOUS BLD VENIPUNCTURE: CPT

## 2022-10-11 NOTE — TELEPHONE ENCOUNTER
2/24/17 message sent to provider. 71 yo male, PMH of HTN,HLD,CAD, Hepatitis C ( completed  Harv2018) and current smoker  presenting with acute dyspnea, found to have hypoxic respiratory distress, hypertension to , and sinus tachycardia, with nonspecific ST changes on EKG, overall concerning for hypertensive emergency with pulmonary edema. Started on nitro drip and placed on bipap. Pt admitted to CCU for further care. Pulm consulted for persistent hypoxemia    CXR: Bilateral perihilar hazy opacities consistent with pulmonary edema. No focal consolidation.  ECHO: severe global LVSD, mild concentric LVH, RVE with nml RV sysfxn, bilateral pleural effusions, moderate PHTN PASP 56, with moderately dilated LA  AB.51/33/73/26/96.4% while on Fio2 40%    #recommendations  POCUS revealing diffuse B lines with smooth pleura and bilateral trace simple pleural effusion much improved since last POCUS  now on RA, lying flat and tolerating exertion   need to continue to fluid restrict, with standing diuresis and control BP given LV dysfxn   now euvolemic  moderate PHTN most likely group 2 PHTN   patient with active long standing smoking, with possible hyperinflation of lungs and emphysema of the lungs, continue spiriva and albuterol q 6  no further pulmonary interventions    Prior to discharge:  Please email: [email or web address] to setup an appointment prior to discharge. Include the patient's name, , MRN and contact information in the email.      Pulmonary/Sleep Clinic  [address] [place] [address]  [phone]      Guillermo Morgan MD  [facility] PGY4  [facility] [address], [facility] [phone]

## 2023-10-18 ENCOUNTER — MED REC SCAN ONLY (OUTPATIENT)
Dept: FAMILY MEDICINE CLINIC | Facility: CLINIC | Age: 78
End: 2023-10-18

## (undated) DEVICE — DRAPE SHEET LG

## (undated) DEVICE — SPINOCAN® 18 GA. X 3-1/2 IN. (90 MM) SPINAL NEEDLE: Brand: SPINOCAN®

## (undated) DEVICE — CS5/5+ FASTPACK, 125ML, 150µ RES: Brand: HAEMONETICS CELL SAVER 5/5+ SYSTEMS

## (undated) DEVICE — SUTURE MONOCRYL 3-0 Y497G

## (undated) DEVICE — BATTERY

## (undated) DEVICE — CONTAINER SPEC STR 4OZ GRY LID

## (undated) DEVICE — SUTURE PDS II 1 CT-1

## (undated) DEVICE — SPONGE LAP 4X18 XRAY STRL

## (undated) DEVICE — Device

## (undated) DEVICE — DRESSING AQUACEL AG 3.5 X 10

## (undated) DEVICE — SUTURE VICRYL 2-0 FS-1

## (undated) DEVICE — Device: Brand: STABLECUT®

## (undated) DEVICE — NEEDLE HPO 18GA 1.5IN ECLPS

## (undated) DEVICE — VIOLET BRAIDED (POLYGLACTIN 910), SYNTHETIC ABSORBABLE SUTURE: Brand: COATED VICRYL

## (undated) DEVICE — SUTURE VICRYL 0 CP-1

## (undated) DEVICE — PRECISION THIN (27.0 X 0.38MM)

## (undated) DEVICE — 450 ML BOTTLE OF 0.05% CHLORHEXIDINE GLUCONATE IN 99.95% STERILE WATER FOR IRRIGATION, USP AND APPLICATOR.: Brand: IRRISEPT ANTIMICROBIAL WOUND LAVAGE

## (undated) DEVICE — 3 ML SYRINGE LUER-LOCK TIP: Brand: MONOJECT

## (undated) DEVICE — DRESSING BIOPATCH 1X4 CNTR

## (undated) DEVICE — GAMMEX® PI HYBRID SIZE 9, STERILE POWDER-FREE SURGICAL GLOVE, POLYISOPRENE AND NEOPRENE BLEND: Brand: GAMMEX

## (undated) DEVICE — SOL  .9 1000ML BTL

## (undated) DEVICE — SPLINT ORTH UNV HIP PD CUP LG

## (undated) DEVICE — CHLORAPREP 26ML APPLICATOR

## (undated) DEVICE — 20 ML SYRINGE LUER-LOCK TIP: Brand: MONOJECT

## (undated) DEVICE — PACK CDS TOTAL HIP

## (undated) DEVICE — X-RAY DETECTABLE SPONGES,16 PLY: Brand: VISTEC

## (undated) DEVICE — SIGMA LCS HIGH PERFORMANCE STERILE THREADED HEADED PINS: Brand: SIGMA LCS HIGH PERFORMANCE

## (undated) DEVICE — SUTURE MONOCRYL 3-0 Y936H

## (undated) DEVICE — DUAL CUT SAGITTAL BLADE

## (undated) DEVICE — FEMORAL CANAL PRESSURIZER WITHOUT HUB, MEDIUM

## (undated) DEVICE — KIT DRN 3/16IN PVC DRN 3 SPRG

## (undated) DEVICE — T5 HOOD WITH PEEL AWAY FACE SHIELD

## (undated) DEVICE — 3M™ IOBAN™ 2 ANTIMICROBIAL INCISE DRAPE 6640EZ: Brand: IOBAN™ 2

## (undated) DEVICE — 3M™ STERI-DRAPE™ U-DRAPE 1015: Brand: STERI-DRAPE™

## (undated) DEVICE — TRAY SRGPRP PVP IOD WT SCRB SM

## (undated) DEVICE — DRAPE SRG 70X60IN SPLT U IMPRV

## (undated) DEVICE — 3M™ MEDITPORE™ SOFT CLOTH TAPE 6 IN X 10 YD 12 ROLLS/CASE 2966: Brand: 3M™ MEDIPORE™

## (undated) DEVICE — 3M™ TEGADERM™ TRANSPARENT FILM DRESSING, 1626W, 4 IN X 4-3/4 IN (10 CM X 12 CM), 50 EACH/CARTON, 4 CARTON/CASE: Brand: 3M™ TEGADERM™

## (undated) DEVICE — BLADE 77.5X11.2MM RECIP SRG

## (undated) DEVICE — DRESSING AQUACEL AG SP 3.5X10

## (undated) DEVICE — PROXIMATE SKIN STAPLERS (35 WIDE) CONTAINS 35 STAINLESS STEEL STAPLES (FIXED HEAD): Brand: PROXIMATE

## (undated) DEVICE — 3M™ STERI-STRIP™ REINFORCED ADHESIVE SKIN CLOSURES, R1547, 1/2 IN X 4 IN (12 MM X 100 MM), 6 STRIPS/ENVELOPE: Brand: 3M™ STERI-STRIP™

## (undated) DEVICE — SIGMA LCS HIGH PERFORMANCE INSTRUMENTS STERILE THREADED PINS: Brand: SIGMA LCS HIGH PERFORMANCE

## (undated) DEVICE — 6.0MM ACORN

## (undated) DEVICE — SURETRANS AUTOTRANSFUSION SYSTEM FOR ORTHOPAEDICS WITH PVC DRAIN AND 2 TROCARS: Brand: SURETRANS AUTOTRANSFUSION SYSTEM FOR ORTHOPAEDICS

## (undated) DEVICE — DERMABOND LIQUID ADHESIVE

## (undated) DEVICE — 3M™ STERI-DRAPE™ INCISE DRAPE 1050 (60CM X 45CM): Brand: STERI-DRAPE™

## (undated) DEVICE — CEMENT MIXING SYSTEM WITH FEMORAL BREAKWAY NOZZLE: Brand: REVOLUTION

## (undated) DEVICE — STERILE LATEX POWDER-FREE SURGICAL GLOVESWITH NITRILE COATING: Brand: PROTEXIS

## (undated) DEVICE — ZIMMER® STERILE DISPOSABLE TOURNIQUET CUFF WITH PLC, DUAL PORT, SINGLE BLADDER, 34 IN. (86 CM)

## (undated) DEVICE — GOWN SURG AERO BLUE PERF LG

## (undated) DEVICE — KNEE IMMOBILIZER: Brand: DEROYAL

## (undated) DEVICE — DECANTER BAG 9": Brand: MEDLINE INDUSTRIES, INC.

## (undated) DEVICE — SOLUTION SURG DURA PREP HAZMAT

## (undated) DEVICE — 3M™ STERI-DRAPE™ PATIENT ISOLATION DRAPE 1014: Brand: STERI-DRAPE™

## (undated) DEVICE — SOL  .9 3000ML

## (undated) DEVICE — TOTAL KNEE: Brand: MEDLINE INDUSTRIES, INC.

## (undated) DEVICE — SUCTION CANISTER, 3000CC,SAFELINER: Brand: DEROYAL

## (undated) DEVICE — OCCLUSIVE GAUZE STRIP,3% BISMUTH TRIBROMOPHENATE IN PETROLATUM BLEND: Brand: XEROFORM

## (undated) DEVICE — COTTON UNDERCAST PADDING,REGULAR FINISH: Brand: WEBRIL

## (undated) DEVICE — CAUTERY TIP BLADE EXTENSION

## (undated) DEVICE — TOWEL OR BLU 16X26 STRL

## (undated) DEVICE — GAMMEX® NON-LATEX PI ORTHO SIZE 9, STERILE POLYISOPRENE POWDER-FREE SURGICAL GLOVE: Brand: GAMMEX

## (undated) NOTE — IP AVS SNAPSHOT
[facility]            (For Outpatient Use Only) Initial Admit Date: 11/10/2017   Inpt/Obs Admit Date: Inpt: 11/10/17 / Obs: N/A   Discharge Date:    Keira Graham:  [de-identified]   MRN: [de-identified]   CSN: [id number]        ENCOUNTER  [facility] Account Financial Class: Medicare Advantage    November 13, 2017

## (undated) NOTE — ED AVS SNAPSHOT
Jose Ramon King   MRN: [id number]    Department:  [facility] Emergency Department   Date of Visit:  5/23/2018           Disclosure     Insurance plans vary and the physician(s) referred by the ER may not be covered by your plan.  Please contact y within the next three months to obtain basic health screening including reassessment of your blood pressure.     IF THERE IS ANY CHANGE OR WORSENING OF YOUR CONDITION, CALL YOUR PRIMARY CARE PHYSICIAN AT ONCE OR RETURN IMMEDIATELY TO THE EMERGENCY DEPARTMEN

## (undated) NOTE — MR AVS SNAPSHOT
[address]  [address] [facility]  [phone]               Thank you for choosing us for your health care visit with Shirlie Baumgarten, MD.  We are glad to serve you and happy to provide you with this summa Place 1 g vaginally every other day. Place  vaginally. Commonly known as:  ESTRACE           famotidine 10 MG Tabs   Take  by mouth. Commonly known as:  PEPCID           FISH OIL OR   Take  by mouth.  Take 1 cap every day           Levothyroxine Sodium discharge instructions in [place] by going to Visits < Admission Summaries. If you've been to the Emergency Department or your doctor's office, you can view your past visit information in [facility] by going to Visits < Visit Summaries. [employer] questions?

## (undated) NOTE — IP AVS SNAPSHOT
Patient Demographics     Address  [address] Phone  [phone] [facility]) *Preferred*  [phone] [facility]) E-mail Address  [email or web address]      Emergency Contact(s)     Name Relation Home Work Mobile    Dariusz Stroud Spouse 11 · To experience mild back pain or soreness for a day or two if you had spinal or epidural anesthesia. · If you had laparoscopic surgery, to feel shoulder pain or discomfort on the day of surgery.    · For some patients to have nausea after surgery/anesthe ferrous sulfate 325 (65 FE) MG [facility]  Start taking on:  11/14/2017      Take 1 tablet (325 mg total) by mouth daily with breakfast.   Luis Enrique Taylor MD         FISH OIL OR  Next dose due:  START IN THE MORNING      Take  by mouth.  Take 1 cap every day Commonly known as:  AMBIEN  Next dose due:  AS NEEDED      TAKE ONE TABLET BY MOUTH ONCE DAILY AT BEDTIME AS NEEDED   Kelly Tapia MD               Where to Get Your Medications      Please  your prescriptions at the location directed by your do Result status: Final result   Ordering provider:  Alina Degroot MD  11/12/17 8321 Resulting lab:  [facility]    Specimen Information    Type Source Collected On   Blood — 11/13/17 1489          Components    Component Value Reference Range Flag Lab H&P signed by Reinaldo Kaminski MD at 11/10/2017  7:29 AM  Version 1 of 1    Author:  Reinaldo Kaminski MD Service:  Orthopedics Author Type:  Physician    Filed:  11/10/2017  7:29 AM Date of Service:  11/10/2017  7:28 AM Status:  Signed    :  Candice Kim 32 tablet Rfl: 4   triamcinolone acetonide 0.1 % External Ointment Apply 1 Application topically 2 (two) times daily. Disp: 1 Tube Rfl: 10   LEVOTHYROXINE SODIUM 112 MCG Oral Tab TAKE ONE TABLET BY MOUTH ON MONDAY,WEDNESDAY AND FRIDAY.  CONTINUE TO TAKE LEV • Hypertension Mother     • Breast Cancer Maternal Grandmother [de-identified]   • Heart Disorder Father         Social History:   Smoking status: Former Smoker                                                              Packs/day: 0.00      Years: 20.00        Types: Plan for surgery per Dr. Michelle Leach   2. Hypothyroidism, unspecified type  Stable. Recent labs.      3. Mixed hyperlipidemia  Stable. On simvastatin     4.  Atherosclerosis of aorta (HCC)  On simvastatin.      5. Preoperative clearance  Pending labs and ekg t 2-1/2 weeks.    She states that she has had increasing symptoms of pain in the left hip and thigh over the past 9-12 months which has become much more advanced over the past month or so.   She denies any identifiable trauma.  She has been followed by rheuma Details of the x-rays were shown and explained to the patient.   ----------------------------------------------------------------------------------------------------------------------------------------------------------------------------------------------- trochanter. No full-thickness or retracted tear. MUSCLES:        No tear or strain.  No inappropriate atrophy.    OTHER:             There is a small left bladder diverticulum. Bladder wall thickening likely due to under distention.  No mass or loculated c ALENDRONATE SODIUM 70 MG Oral Tab TAKE ONE TABLET BY MOUTH ONCE A WEEK Disp: 12 tablet Rfl: 3   SIMVASTATIN 5 MG Oral Tab TAKE 1 TABLET BY MOUTH NIGHTLY.  CANCEL THE CRESTOR Disp: 90 tablet Rfl: 0   FOLIC ACID 1 MG Oral Tab TAKE ONE TABLET BY MOUTH ONCE RADHA   Per NG: right common femoral artery pseudoaneurysm repair post cath   • Rheumatoid arthritis [facility])     • Thyroid disease 1983   • Unspecified essential hypertension         Surgical History:  Past Surgical History:  No date: CARPAL TUNNEL RELEASE Bilater · Palpable pedal pulses.     · Leg lengths demonstrate slight[WH.1] lengthening[WH.2] on the ipsilateral left side[WH.1] (approximately 4-5 mm longer with apparent measurement)[WH.2].            IMAGING: X-RAYS: AP OF THE PELVIS; LEFT HIP AP AND LATERAL: Ag to – the small but definitive risk of infection and delayed wound healing (with increased incidence discussed in patients with obesity, diabetes, history of cigarette smoking, connective tissue disorders including psoriatic arthritis, chronic renal failure GI/ and/or cerebral problems (including stroke) despite medical and anesthesia clearance; etc. the increased risk for infection and delayed wound healing as well as periprosthetic fractures and mechanical loosening was discussed in great detail given her Discharge Summaries signed by Michelle Reaves MD at 11/13/2017  2:10 PM  Version 1 of 1    Author:  Michelle Reaves MD Service:  Hospitalist Author Type:  Physician    Filed:  11/13/2017  2:10 PM Date of Service:  11/13/2017  2:07 PM Status:  Signed Result Date: 11/2/2017  CONCLUSION:  1. Atherosclerotic calcification aorta. 2. No active disease in chest.         Xr Hip (1 View) Unilateral Em    Result Date: 11/10/2017  CONCLUSION:  1.  Status post left total hip arthroplasty           Discharge Medica TAKE ONE TABLET BY MOUTH ONCE DAILY   Quantity:  90 tablet  Refills:  0     famotidine 10 MG Tabs  Commonly known as:  PEPCID      Take by mouth daily. Refills:  0     FISH OIL OR      Take  by mouth.  Take 1 cap every day   Refills:  0     folic acid 1 Other Discharge Instructions: None  >30 MIN SPENT ON THIS DC   ZAIDA FAY NACHO  11/13/2017  2:07 PM[RZ.1]            Electronically signed by Willam Claire MD on 11/13/2017  2:10 PM   Attribution Key    RZ. 1 - Willam Claire MD on 11/13/2017  2:0 all needs within reach. Pt is on track to dc to rehab once medically cleared. DISCHARGE RECOMMENDATIONS  PT Discharge Recommendations: Cont skilled therapy in a supervised setting     PLAN  PT Treatment Plan: Body mechanics; Bed mobility; Endurance; Patie Pattern:  (hopping TTWB)  Stoop/Curb Assistance: Not tested  Comment : Pt AMB from the chair to the bathroom and back to the chauir    THERAPEUTIC EXERCISES    Patient End of Session: Up in chair;Needs met;Call light within reach;RN aware of session/findin Active Problems:    Hyperlipidemia    Hypertension    Hypothyroid    Arthritis of left hip[ST. 2]      ASSESSMENT   Consulted w/ RN prior to seeing pt before both AM & PM treatment sessions. WB status was changed by Dr. Erin Mckeon this morning.  New WB status: Location: Left hip  Management Techniques: Activity promotion; Body mechanics;Repositioning[ST. 2]    BALANCE[ST.1]  Static Sitting: Fair +  Dynamic Sitting: Fair +  Static Standing: Fair -  Dynamic Standing: Fair -[ST. 2]    ACTIVITY TOLERANCE  Room air    A Current Status amb 15 ft w/ RW & Min A w/ chair follow   Goal #4 Patient will negotiate 6 stairs/one curb w/ assistive device and supervision   Goal #4   Current Status NT   Goal #5 Patient verbalizes and/or demonstrates all precautions and safety concerns left lower extremity. Patient was observed wincing while weight bearing and with increased pain, patient using PCA. Patient was left in bedside chair at end of session with all needs in reach.  She would benefit from continued skilled physical therapy servi Comment: PEr NG: total knee replace ment  No date: [facility] NEEDLE LOCALIZATION W/ SPECIMEN 1 SITE RIG*  No date: OTHER SURGICAL HISTORY  No date: TONSILLECTOMY  11/10/2017: TOTAL HIP REPLACEMENT Left      Comment:  [address]: [address] [place] [address] AM-PAC '6-Clicks' INPATIENT SHORT FORM - BASIC MOBILITY  How much difficulty does the patient currently have. ..  -   Turning over in bed (including adjusting bedclothes, sheets and blankets)?: A Lot   -   Sitting down on and standing up from a chair with a Current Status    Goal #4 Patient will negotiate 6 stairs/one curb w/ assistive device and supervision   Goal #4   Current Status    Goal #5 Patient verbalizes and/or demonstrates all precautions and safety concerns independently   Goal #5   Current Status bearing, foot flat. Pt adhered to WB status during mobility, and reports that in standing she feels more comfortable to keep L lower extremity in NWB as she feels nervous that she will break 30% minimum precaution.  She was able to complete lower body dress Management Techniques:  (pain decreased when back in sitting position)[SE.2]     ACTIVITY TOLERANCE  Room air  No shortness of breath    ACTIVITIES OF DAILY LIVING ASSESSMENT  AM-PAC ‘6-Clicks’ Inpatient Daily Activity Short Form  How much help from anothe Patient self-stated goal is: Recover from surgery and be doing better     Patient will complete LE dressing with SBA with AE prn and adhere to posterior hip precautions.    Comment: Pt required min assist and min cuing to complete lower body with AE and was ASSESSMENT   Rn notified prior to OT evaluation. Pt was received in bedside chair, accompanied by spouse. [SE.1] Pt is a L OMAYRA and was able to recall 3/3 posterior hip precautions and demonstrated understanding throughout session.  Her WB status for L lower \"My chest hurts here. .. Not like a heart attack\". [SE.3]    OBJECTIVE[SE.1]  Precautions: OMAYRA - posterior;Hip abduction pillow[SE.2]    WEIGHT BEARING RESTRICTION[SE.1]  Weight Bearing Restriction: L lower extremity    L Lower Extremity: Toe Touch Weight Upper Body Dressing: indep  Lower Body Dressing: min assist    Education Provided: P[SE.1]t[SE.3] was educated on 3[SE.1]-[SE.3]in[SE.1]-[SE.3]1 commode[SE.1] to assist with independence for toilet transfers. [SE.3]   Patient End of Session: Up in chair; [place] Presenting Problem:  (s/p L OMAYRA-posterior approach)    Physician Order: IP Consult to Occupational Therapy  Reason for Therapy: ADL/IADL Dysfunction and Discharge Planning    OCCUPATIONAL THERAPY ASSESSMENT     Patient is a 67year old female admitted 11/1 Hyperlipidemia    Hypertension    Hypothyroid    Arthritis of left hip      Past Medical History  Past Medical History:   Diagnosis Date   • Depression ?    • Diabetes ([facility] [address]    • Disorder of thyroid     hypothyroid   • High blood pressure    • High choles Precautions: OMAYRA - posterior;Hip abduction pillow  Fall Risk: High fall risk    WEIGHT BEARING RESTRICTION  Weight Bearing Restriction: L lower extremity           L Lower Extremity: Toe Touch Weight Bearing (As of 11/11/17 TDWB w/maximum foot flat for tra Toileting: n/t   Upper Body Dressing: n/t   Lower Body Dressing: max.  A       Education Provided: Role of OT, posterior hip precautions, LLE TTWB, safe hand placement with sit to stands, ADLs, adaptive equipment training    Patient End of Session: Up in

## (undated) NOTE — LETTER
02/04/19        [facility]  [address] [address]      Dear Minus Copa,    [facility] records indicate that you have outstanding lab work and or testing that was ordered for you and has not yet been completed:  No orders of the defined types we

## (undated) NOTE — MR AVS SNAPSHOT
[facility]  [address] [place] [address], [address] [facility]  Melo Lang               Thank you for choosing us for your health care visit with Trevon Lyles MD.  We are glad to serve you and happy to provide you with this summary [address]  [address]  [address] [place] [address]    Arlene Tirado 10  [address] [place], [place]    It is the patient's responsibility to check with and follow their insurance * HYDROcodone-acetaminophen  MG Tabs   Take 1 tablet by mouth every 12 (twelve) hours as needed for Pain.    Commonly known as:  NORCO           * HYDROcodone-acetaminophen  MG Tabs   Take 1 tablet by mouth every 12 (twelve) hours as needed for * Notice: This list has 6 medication(s) that are the same as other medications prescribed for you. Read the directions carefully, and ask your doctor or other care provider to review them with you.             MyChart     Visit [employer]  You can access

## (undated) NOTE — LETTER
10/05/18        [facility]  [address] [facility] [address] [address]      Dear Cristian Bailey,    [address] records indicate that you have outstanding lab work and or testing that was ordered for you and has not yet been completed:  Orders Placed This Encounter      Deangelo

## (undated) NOTE — LETTER
Dear Dr. Marilyn Tidwell    This letter is to inform you that Claudia Adair has been attending Physical Therapy with me. See below for my most recent plan of care. Physical Therapy Progress Report    Pt has attended 10 visits in Physical Therapy. - standing B heel raises on slant board 2x10  - B L/E shuttle knee extension 5 bands 2 x 15 (setting 4)  - L L/E shuttle knee extension 3 bands 2 x 10 (setting 4)    Plan: cont PT.      Charges: ex 3    Total Timed Treatment: 45  min  Total Treatment Time:

## (undated) NOTE — LETTER
10/23/2017              Jose Saenz         Dear Dr. Mervat Dickson,     Mrs. Koch Sender is cleared for surgery.  She can stop her methotrexate and xeljanz the week of surgery on 11/6/2017 and she can resume

## (undated) NOTE — LETTER
Carmencita Wilson, [address]  [address]  [address] [facility] [address]  [address]       12/05/18        Patient: Kenya Chisholm   YOB: 1945   Date of Visit: 12/4/2018       Dear  Dr. Kayleen Beasley MD,      Thank you for referring Kenya Kathrine to my

## (undated) NOTE — MR AVS SNAPSHOT
[facility]  [address] [place] [address], [address] [place] [address]               Thank you for choosing us for your health care visit with Cuauhtemoc Lynn MD.  We are glad to serve you and happy to provide you with this summary Calcium 600 MG Tabs   Take 1,200 mg by mouth daily. Take 2 tabs every day           Citalopram Hydrobromide 20 MG Tabs   Take 1 tablet (20 mg total) by mouth daily.    Commonly known as:  CeleXA           Estradiol 0.1 MG/GM Crea   Place 1 g vaginally ever lisinopril 40 MG Tabs   Take 1 tablet (40 mg total) by mouth daily.    Commonly known as:  PRINIVIL,ZESTRIL           methotrexate 2.5 MG Tabs   TAKE EIGHT TABLETS BY MOUTH ONCE A WEEK   Commonly known as:  RHEUMATREX           MULTI-VITAMINS Tabs   Take Assoc Dx:  Rheumatoid arthritis involving multiple sites, unspecified rheumatoid factor presence ([facility] [M06.9], Therapeutic drug monitoring [Z51.81]           CBC W Differential W Platelet    Expires on:  Jan 26, 2018    Assoc Dx:  Rheumatoid arthritis in

## (undated) NOTE — LETTER
Dear Dr. Jorge Carroll,    This letter is to inform you that Marlys Meadows has been attending Physical Therapy with me. See below for my most recent plan of care.        Diagnosis: Left Total Hip Arthroplasty (Posterior approach)         Authorized # of Visits

## (undated) NOTE — MR AVS SNAPSHOT
[address]  [address] [facility]  [phone]               Thank you for choosing us for your health care visit with Johnie Quinones MD.  We are glad to serve you and happy to provide you with this summa Instructions: To schedule a test at any [facility] Scheduling at   [phone].          Reason for Today's Visit     Routine Physical           Medical Issues Discussed Today     Atherosclerosis of aorta ([facility] [address] EKG - covered if needed at Welcome to Medicare, and non-screening if indicated for medical reasons Electrocardiogram date Routine EKG is not a screening covered service except at the Welcome to Medicare Visit    Abdominal aortic aneurysm screening (once b every 2 yrs up to age 79 or Yearly if High Risk   There are no preventive care reminders to display for this patient. Chlamydia  Annually if high risk No results found for: CHLAMYDIA No flowsheet data found.     Screening Mammogram      Mammogram    Rec information including definitions of the different types of Advance Directives. It also has the State forms available on it's website for anyone to review and print using their home computer and printer. (the forms are also available in [address])  www. [employer] Commonly known as:  RHEUMATREX           MULTI-VITAMINS Tabs   Take  by mouth. Potassium 99 MG Tabs   Take 1 tablet by mouth daily. pregabalin 50 MG Caps   Take 1 capsule (50 mg total) by mouth 2 (two) times daily.  1 capsule 50mg at nig ? Keep objects that you use often within easy reach. BATHROOM:  ? Install grab bars on the bathroom walls beside the tub, shower and toilet. ? Use a non skid rubber mat in the tub/shower.   ? If you are unsteady on your feet you may want to use a shower c Eat plenty of protein, keep the fat content low Sugars:  sodas and sports drinks, candies and desserts   Eat plenty of low-fat dairy products High fat meats and dairy   Choose whole grain products Foods high in sodium   Water is best for hydration Fast venecia

## (undated) NOTE — LETTER
02/04/19        Estil Leader  [address] [address]      Dear Anahi Johnson,    Our records indicate that you have outstanding lab work and or testing that was ordered for you and has not yet been completed:  QUANTIFERON TB  ALT (SGPT)  AST (

## (undated) NOTE — MR AVS SNAPSHOT
[facility]  [address] [place] [address], [address] [place] [address]               Thank you for choosing us for your health care visit with Audra Dorsey MD.  We are glad to serve you and happy to provide you with this summary system. This medicine is used to treat rheumatoid arthritis. How should I use this medicine? Take this medicine by mouth with a glass of water. Follow the directions on the prescription label. You can take it with or without food.  If it upsets your stoma If you miss a dose, take it as soon as you can. If it is almost time for your next dose, take only that dose. Do not take double or extra doses. Where should I keep my medicine? Keep out of the reach of children.   Store between 20 and 25 degrees C (68 an 138/70 mmHg 70 97.9 °F (36.6 °C) 4' 11.5\" (1.511 m) 170 lb (77.111 kg) 33.77 kg/m2         Current Medications          This list is accurate as of: 4/27/17  3:06 PM.  Always use your most recent med list.                Alendronate Sodium 70 MG Tabs   o What changed:  See the new instructions. Commonly known as:  AZULFIDINE           triamcinolone acetonide 0.1 % Oint   Apply 1 Application topically 2 (two) times daily.    Commonly known as:  KENALOG           Vitamin D3 1000 units Tabs   Take 1 tablet b